# Patient Record
Sex: MALE | Race: WHITE | NOT HISPANIC OR LATINO | Employment: UNEMPLOYED | URBAN - METROPOLITAN AREA
[De-identification: names, ages, dates, MRNs, and addresses within clinical notes are randomized per-mention and may not be internally consistent; named-entity substitution may affect disease eponyms.]

---

## 2018-08-23 ENCOUNTER — HOSPITAL ENCOUNTER (INPATIENT)
Facility: HOSPITAL | Age: 53
LOS: 2 days | Discharge: HOME | DRG: 566 | End: 2018-08-25
Attending: EMERGENCY MEDICINE | Admitting: INTERNAL MEDICINE
Payer: MEDICAID

## 2018-08-23 ENCOUNTER — APPOINTMENT (EMERGENCY)
Dept: CT IMAGING | Facility: HOSPITAL | Age: 53
DRG: 566 | End: 2018-08-23
Payer: MEDICAID

## 2018-08-23 DIAGNOSIS — N17.9 AKI (ACUTE KIDNEY INJURY) (HCC): ICD-10-CM

## 2018-08-23 DIAGNOSIS — R73.9 HYPERGLYCEMIA: Primary | ICD-10-CM

## 2018-08-23 DIAGNOSIS — K85.90 PANCREATITIS: ICD-10-CM

## 2018-08-23 DIAGNOSIS — E11.65 TYPE 2 DIABETES MELLITUS WITH HYPERGLYCEMIA, WITH LONG-TERM CURRENT USE OF INSULIN (HCC): ICD-10-CM

## 2018-08-23 DIAGNOSIS — L98.9 SKIN LESION: ICD-10-CM

## 2018-08-23 DIAGNOSIS — Z79.4 TYPE 2 DIABETES MELLITUS WITH HYPERGLYCEMIA, WITH LONG-TERM CURRENT USE OF INSULIN (HCC): ICD-10-CM

## 2018-08-23 DIAGNOSIS — R10.9 ABDOMINAL PAIN: ICD-10-CM

## 2018-08-23 PROBLEM — K86.1 CHRONIC PANCREATITIS (HCC): Status: ACTIVE | Noted: 2018-08-23

## 2018-08-23 PROBLEM — E83.52 HYPERCALCEMIA: Status: ACTIVE | Noted: 2018-08-23

## 2018-08-23 PROBLEM — D35.00 ADRENAL ADENOMA: Status: ACTIVE | Noted: 2018-08-23

## 2018-08-23 PROBLEM — E87.1 HYPONATREMIA: Status: ACTIVE | Noted: 2018-08-23

## 2018-08-23 LAB
ACETONE SERPL-MCNC: NEGATIVE MG/DL
ALBUMIN SERPL BCP-MCNC: 3.6 G/DL (ref 3.5–5)
ALP SERPL-CCNC: 155 U/L (ref 46–116)
ALT SERPL W P-5'-P-CCNC: 14 U/L (ref 12–78)
ANION GAP SERPL CALCULATED.3IONS-SCNC: 12 MMOL/L (ref 4–13)
AST SERPL W P-5'-P-CCNC: 9 U/L (ref 5–45)
BACTERIA UR QL AUTO: ABNORMAL /HPF
BASE EX.OXY STD BLDV CALC-SCNC: 60.6 % (ref 60–80)
BASE EXCESS BLDV CALC-SCNC: 1.9 MMOL/L
BASOPHILS # BLD AUTO: 0.01 THOUSANDS/ΜL (ref 0–0.1)
BASOPHILS NFR BLD AUTO: 0 % (ref 0–1)
BILIRUB SERPL-MCNC: 0.31 MG/DL (ref 0.2–1)
BILIRUB UR QL STRIP: NEGATIVE
BUN SERPL-MCNC: 18 MG/DL (ref 5–25)
CALCIUM SERPL-MCNC: 10.4 MG/DL (ref 8.3–10.1)
CHLORIDE SERPL-SCNC: 90 MMOL/L (ref 100–108)
CLARITY UR: CLEAR
CO2 SERPL-SCNC: 28 MMOL/L (ref 21–32)
COLOR UR: YELLOW
CREAT SERPL-MCNC: 1.54 MG/DL (ref 0.6–1.3)
EOSINOPHIL # BLD AUTO: 0.1 THOUSAND/ΜL (ref 0–0.61)
EOSINOPHIL NFR BLD AUTO: 1 % (ref 0–6)
ERYTHROCYTE [DISTWIDTH] IN BLOOD BY AUTOMATED COUNT: 13.8 % (ref 11.6–15.1)
ETHANOL SERPL-MCNC: <3 MG/DL (ref 0–3)
GFR SERPL CREATININE-BSD FRML MDRD: 51 ML/MIN/1.73SQ M
GLUCOSE SERPL-MCNC: 655 MG/DL (ref 65–140)
GLUCOSE SERPL-MCNC: >500 MG/DL (ref 65–140)
GLUCOSE UR STRIP-MCNC: ABNORMAL MG/DL
HCO3 BLDV-SCNC: 26.8 MMOL/L (ref 24–30)
HCT VFR BLD AUTO: 42.6 % (ref 36.5–49.3)
HGB BLD-MCNC: 14 G/DL (ref 12–17)
HGB UR QL STRIP.AUTO: NEGATIVE
KETONES UR STRIP-MCNC: ABNORMAL MG/DL
LEUKOCYTE ESTERASE UR QL STRIP: ABNORMAL
LIPASE SERPL-CCNC: 1013 U/L (ref 73–393)
LYMPHOCYTES # BLD AUTO: 1.36 THOUSANDS/ΜL (ref 0.6–4.47)
LYMPHOCYTES NFR BLD AUTO: 19 % (ref 14–44)
MCH RBC QN AUTO: 29.5 PG (ref 26.8–34.3)
MCHC RBC AUTO-ENTMCNC: 32.9 G/DL (ref 31.4–37.4)
MCV RBC AUTO: 90 FL (ref 82–98)
MONOCYTES # BLD AUTO: 0.64 THOUSAND/ΜL (ref 0.17–1.22)
MONOCYTES NFR BLD AUTO: 9 % (ref 4–12)
NEUTROPHILS # BLD AUTO: 4.99 THOUSANDS/ΜL (ref 1.85–7.62)
NEUTS SEG NFR BLD AUTO: 70 % (ref 43–75)
NITRITE UR QL STRIP: NEGATIVE
NON-SQ EPI CELLS URNS QL MICRO: ABNORMAL /HPF
NRBC BLD AUTO-RTO: 0 /100 WBCS
O2 CT BLDV-SCNC: 12.2 ML/DL
PCO2 BLDV: 43.2 MM HG (ref 42–50)
PH BLDV: 7.41 [PH] (ref 7.3–7.4)
PH UR STRIP.AUTO: 5.5 [PH] (ref 4.5–8)
PLATELET # BLD AUTO: 474 THOUSANDS/UL (ref 149–390)
PMV BLD AUTO: 10.5 FL (ref 8.9–12.7)
PO2 BLDV: 32.1 MM HG (ref 35–45)
POTASSIUM SERPL-SCNC: 4.1 MMOL/L (ref 3.5–5.3)
PROT SERPL-MCNC: 9.4 G/DL (ref 6.4–8.2)
PROT UR STRIP-MCNC: NEGATIVE MG/DL
RBC # BLD AUTO: 4.74 MILLION/UL (ref 3.88–5.62)
RBC #/AREA URNS AUTO: ABNORMAL /HPF
SODIUM SERPL-SCNC: 130 MMOL/L (ref 136–145)
SP GR UR STRIP.AUTO: <=1.005 (ref 1–1.03)
UROBILINOGEN UR QL STRIP.AUTO: 0.2 E.U./DL
WBC # BLD AUTO: 7.1 THOUSAND/UL (ref 4.31–10.16)
WBC #/AREA URNS AUTO: ABNORMAL /HPF

## 2018-08-23 PROCEDURE — 82805 BLOOD GASES W/O2 SATURATION: CPT | Performed by: EMERGENCY MEDICINE

## 2018-08-23 PROCEDURE — 96374 THER/PROPH/DIAG INJ IV PUSH: CPT

## 2018-08-23 PROCEDURE — 85025 COMPLETE CBC W/AUTO DIFF WBC: CPT | Performed by: EMERGENCY MEDICINE

## 2018-08-23 PROCEDURE — 96361 HYDRATE IV INFUSION ADD-ON: CPT

## 2018-08-23 PROCEDURE — 96375 TX/PRO/DX INJ NEW DRUG ADDON: CPT

## 2018-08-23 PROCEDURE — 81001 URINALYSIS AUTO W/SCOPE: CPT

## 2018-08-23 PROCEDURE — 80320 DRUG SCREEN QUANTALCOHOLS: CPT | Performed by: EMERGENCY MEDICINE

## 2018-08-23 PROCEDURE — 99285 EMERGENCY DEPT VISIT HI MDM: CPT

## 2018-08-23 PROCEDURE — 36415 COLL VENOUS BLD VENIPUNCTURE: CPT | Performed by: EMERGENCY MEDICINE

## 2018-08-23 PROCEDURE — 83690 ASSAY OF LIPASE: CPT | Performed by: EMERGENCY MEDICINE

## 2018-08-23 PROCEDURE — 74177 CT ABD & PELVIS W/CONTRAST: CPT

## 2018-08-23 PROCEDURE — 82948 REAGENT STRIP/BLOOD GLUCOSE: CPT

## 2018-08-23 PROCEDURE — 99223 1ST HOSP IP/OBS HIGH 75: CPT | Performed by: INTERNAL MEDICINE

## 2018-08-23 PROCEDURE — 80053 COMPREHEN METABOLIC PANEL: CPT | Performed by: EMERGENCY MEDICINE

## 2018-08-23 PROCEDURE — 96360 HYDRATION IV INFUSION INIT: CPT

## 2018-08-23 PROCEDURE — 82009 KETONE BODYS QUAL: CPT | Performed by: EMERGENCY MEDICINE

## 2018-08-23 RX ORDER — ONDANSETRON 2 MG/ML
4 INJECTION INTRAMUSCULAR; INTRAVENOUS ONCE
Status: COMPLETED | OUTPATIENT
Start: 2018-08-23 | End: 2018-08-23

## 2018-08-23 RX ORDER — NICOTINE 21 MG/24HR
1 PATCH, TRANSDERMAL 24 HOURS TRANSDERMAL DAILY
Status: DISCONTINUED | OUTPATIENT
Start: 2018-08-24 | End: 2018-08-25 | Stop reason: HOSPADM

## 2018-08-23 RX ORDER — FENTANYL CITRATE 50 UG/ML
50 INJECTION, SOLUTION INTRAMUSCULAR; INTRAVENOUS ONCE
Status: COMPLETED | OUTPATIENT
Start: 2018-08-23 | End: 2018-08-23

## 2018-08-23 RX ORDER — ALPRAZOLAM 2 MG/1
2 TABLET, EXTENDED RELEASE ORAL EVERY MORNING
COMMUNITY
End: 2018-08-25 | Stop reason: HOSPADM

## 2018-08-23 RX ORDER — SODIUM CHLORIDE 9 MG/ML
125 INJECTION, SOLUTION INTRAVENOUS CONTINUOUS
Status: DISCONTINUED | OUTPATIENT
Start: 2018-08-23 | End: 2018-08-24

## 2018-08-23 RX ORDER — OXYCODONE HYDROCHLORIDE AND ACETAMINOPHEN 5; 325 MG/1; MG/1
1 TABLET ORAL EVERY 4 HOURS PRN
Status: DISCONTINUED | OUTPATIENT
Start: 2018-08-23 | End: 2018-08-24

## 2018-08-23 RX ORDER — ONDANSETRON 2 MG/ML
4 INJECTION INTRAMUSCULAR; INTRAVENOUS EVERY 6 HOURS PRN
Status: DISCONTINUED | OUTPATIENT
Start: 2018-08-23 | End: 2018-08-25 | Stop reason: HOSPADM

## 2018-08-23 RX ORDER — CLONIDINE HYDROCHLORIDE 0.1 MG/1
0.3 TABLET ORAL 2 TIMES DAILY
Status: DISCONTINUED | OUTPATIENT
Start: 2018-08-23 | End: 2018-08-25 | Stop reason: HOSPADM

## 2018-08-23 RX ORDER — ACETAMINOPHEN 325 MG/1
650 TABLET ORAL EVERY 6 HOURS SCHEDULED
Status: DISCONTINUED | OUTPATIENT
Start: 2018-08-23 | End: 2018-08-24

## 2018-08-23 RX ORDER — CLONIDINE HYDROCHLORIDE 0.3 MG/1
0.3 TABLET ORAL 2 TIMES DAILY
COMMUNITY

## 2018-08-23 RX ORDER — ALPRAZOLAM 0.5 MG/1
2 TABLET ORAL DAILY
Status: DISCONTINUED | OUTPATIENT
Start: 2018-08-24 | End: 2018-08-24

## 2018-08-23 RX ORDER — INSULIN GLARGINE 100 [IU]/ML
40 INJECTION, SOLUTION SUBCUTANEOUS EVERY MORNING
Status: DISCONTINUED | OUTPATIENT
Start: 2018-08-24 | End: 2018-08-23

## 2018-08-23 RX ORDER — HYDROXYZINE HYDROCHLORIDE 25 MG/1
25 TABLET, FILM COATED ORAL EVERY 6 HOURS PRN
Status: DISCONTINUED | OUTPATIENT
Start: 2018-08-23 | End: 2018-08-25 | Stop reason: HOSPADM

## 2018-08-23 RX ADMIN — IODIXANOL 90 ML: 320 INJECTION, SOLUTION INTRAVASCULAR at 18:38

## 2018-08-23 RX ADMIN — CLONIDINE HYDROCHLORIDE 0.3 MG: 0.1 TABLET ORAL at 21:05

## 2018-08-23 RX ADMIN — INSULIN LISPRO 5 UNITS: 100 INJECTION, SOLUTION INTRAVENOUS; SUBCUTANEOUS at 22:18

## 2018-08-23 RX ADMIN — ACETAMINOPHEN 650 MG: 325 TABLET ORAL at 22:19

## 2018-08-23 RX ADMIN — ONDANSETRON 4 MG: 2 INJECTION INTRAMUSCULAR; INTRAVENOUS at 17:47

## 2018-08-23 RX ADMIN — HYDROMORPHONE HYDROCHLORIDE 0.5 MG: 1 INJECTION, SOLUTION INTRAMUSCULAR; INTRAVENOUS; SUBCUTANEOUS at 19:07

## 2018-08-23 RX ADMIN — SODIUM CHLORIDE 1000 ML: 0.9 INJECTION, SOLUTION INTRAVENOUS at 17:43

## 2018-08-23 RX ADMIN — SODIUM CHLORIDE 125 ML/HR: 0.9 INJECTION, SOLUTION INTRAVENOUS at 21:47

## 2018-08-23 RX ADMIN — OXYCODONE HYDROCHLORIDE AND ACETAMINOPHEN 1 TABLET: 5; 325 TABLET ORAL at 21:05

## 2018-08-23 RX ADMIN — INSULIN LISPRO 10 UNITS: 100 INJECTION, SOLUTION INTRAVENOUS; SUBCUTANEOUS at 22:19

## 2018-08-23 RX ADMIN — FENTANYL CITRATE 50 MCG: 50 INJECTION INTRAMUSCULAR; INTRAVENOUS at 17:47

## 2018-08-23 NOTE — ED ATTENDING ATTESTATION
Elvis Nicole MD, saw and evaluated the patient  I have discussed the patient with the resident/non-physician practitioner and agree with the resident's/non-physician practitioner's findings, Plan of Care, and MDM as documented in the resident's/non-physician practitioner's note, except where noted  All available labs and Radiology studies were reviewed  At this point I agree with the current assessment done in the Emergency Department  I have conducted an independent evaluation of this patient a history and physical is as follows:  49 yo male visiting area from [place], living in hotel for 2 weeks with diabetes, brought to ED by ambulance with concern that his blood sugar are high, found to be >500 in ED, c/o fatigue, polyuria, polydipsia, abdominal pain, nausea without vomiting, no diarrhea, similar to previous of pancreatitis, and is concerned about wounds on his torso and arms that have been worsening for several weeks at this point  VS normal   Abd tender in the epigastric with some guarding  He has large excoriated, abraded patches on arms and buttocks, without surrounding erythema/induration/fluctuance  ED workup for complications of diabetes, check for pancreatitis, CT, replete fluids, treat pain, reassess  Although patient did not mention it, he was evaluated at [facility] 8/19/18 for similar complaint, with hyperglycemia, no DKA  Other than that, this is his only encounter on EPIC between our two hospitals  There was documentation of a discussion regarding recent opioids that patient wanted refilled overlapping with Rx in [place] on   Reviewing [place]  myself he had opioids filled 8/1 and 8/11 by two different providers and two different pharmacies  ED workup show pancreatitis, volume depletion, MAYRA  Admit      CriticalCare Time  Performed by: Cuauhtemoc Merritt  Authorized by: Prashant Roman, [address] [facility] provider statement:     Critical care time (minutes):  30    Critical care time was exclusive of:  Separately billable procedures and treating other patients and teaching time    Critical care was necessary to treat or prevent imminent or life-threatening deterioration of the following conditions:  Dehydration, pancreatits    Critical care was time spent personally by me on the following activities:  Blood draw for specimens, obtaining history from patient or surrogate, development of treatment plan with patient or surrogate, discussions with consultants, evaluation of patient's response to treatment, examination of patient, interpretation of cardiac output measurements, ordering and performing treatments and interventions, ordering and review of laboratory studies, ordering and review of radiographic studies, re-evaluation of patient's condition and review of old charts    I assumed direction of critical care for this patient from another provider in my specialty: no             Critical Care Time  CritCare Time    Procedures

## 2018-08-23 NOTE — ED NOTES
RN unable to obtain IV access at this time  Another RN at bedside attempting at this time       Tracie Adjutant, YOLANDA  08/23/18 4500

## 2018-08-23 NOTE — ASSESSMENT & PLAN NOTE
Present on admission secondary to hyperglycemia    Corrected sodium is 135  Continue with IV hydration with normal saline  Follow BMP closely

## 2018-08-23 NOTE — H&P
H&P- Helen Alas 1965, 48 y o  male MRN: [id number]    Unit/Bed#: [facility] [facility] 2 -01 Encounter: [id number]    Primary Care Provider: No primary care provider on file  Date and time admitted to hospital: 8/23/2018  5:11 PM        * Type 2 diabetes mellitus with hyperglycemia, with long-term current use of insulin ([facility] [address] )   Assessment & Plan    No results found for: HGBA1C    Recent Labs      08/23/18   1714   POCGLU  >500*       Blood Sugar Average: Last 72 hrs:  (P) 0   Patient presents with elevated blood sugar level  Complains of polydipsia and polyuria    Unknown last hemoglobin A1c  Similar admission last week at [facility]  Denies nausea vomiting or abdominal pain  Follow up on hemoglobin A1c  Continue with Lantus and pre meal insulin  Endocrine consultation will be obtained to establish outpatient follow-up        Skin lesion of back   Assessment & Plan    Patient has multiple excoriated pruritic lesions on back and upper aspect of chest   Could be related to poorly controlled diabetes  Dermatology consultation will be obtained  Continue with Atarax  Calamine  lotion for itching        Adrenal adenoma   Assessment & Plan    Incidental finding of subcentimeter adrenal adenomas  Outpatient follow-up CT scan in 1 year  Hypercalcemia   Assessment & Plan    Corrected calcium for low albumin is 10 7  Monitor ionized calcium and repeat after IV hydration        Hyponatremia   Assessment & Plan    Present on admission secondary to hyperglycemia  Corrected sodium is 135  Continue with IV hydration with normal saline  Follow BMP closely        Chronic pancreatitis [facility])   Assessment & Plan    Unclear etiology  Patient denies alcohol use and admits to daily marijuana use  Continue with IV fluids, antiemetics as needed analgesics  Avoid narcotic medication  Acute kidney injury [facility])   Assessment & Plan    Creatinine and 1 week back at [facility] was 1    Likely prerenal secondary to poor oral intake  Continue with IV hydration  Hold metformin  Follow up BMP  CT abdomen without obstructive etiology          VTE Prophylaxis: Enoxaparin (Lovenox)  / sequential compression device   Code Status: FULL CODE  POLST: POLST form is not discussed and not completed at this time  Discussion with family:  Discussed with patient    Anticipated Length of Stay:  Patient will be admitted on an Inpatient basis with an anticipated length of stay of  > 2 midnights  Justification for Hospital Stay:  IV fluids and blood glucose monitoring  Total Time for Visit, including Counseling / Coordination of Care: 30 minutes  Greater than 50% of this total time spent on direct patient counseling and coordination of care  Chief Complaint:   High blood sugar    History of Present Illness:    Zan Torres is a 48 y o  male who presents with elevated blood glucose level  Patient has history of type 2 diabetes which is insulin dependent and has had a recent presentation to [facility] Emergency Department  on 08/19 for similar complaint with hyperglycemia without DKA  He was discharged on 40 units of insulin glargine and insulin lispro sliding scale with meals  He also takes metformin 1000 mg twice daily  Patient however continued to complain of polyuria and polydipsia  He also complained of abdominal discomfort and multiple wounds on his back which r hospital there for over a month  He also complains of intense pruritus and has multiple excoriated lesions on his lower back anterior upper aspect of chest   Denied any fever or chills  Denied any nausea vomiting or diarrhea  Denied any drainage or discharge from his wounds  Patient does complain of abdominal discomfort which is chronic    He was demanding refilling of his narcotic medications however [facility] by the emergency room physician patient has had 2 narcotic refills on 8/1 and 8/11 Review of Systems:    Review of Systems   Constitutional: Positive for appetite change  HENT: Negative  Eyes: Negative  Respiratory: Negative  Cardiovascular: Negative  Gastrointestinal: Positive for abdominal pain  Endocrine: Positive for polydipsia and polyuria  Genitourinary: Positive for frequency  Musculoskeletal: Negative  Skin: Positive for rash and wound  Allergic/Immunologic: Negative  Neurological: Negative  Hematological: Negative  Psychiatric/Behavioral: Negative  Past Medical and Surgical History:     Past Medical History:   Diagnosis Date    Diabetes mellitus ([facility] [address] )     Hyperlipidemia     Hypertension        Past Surgical History:   Procedure Laterality Date    ABCESS DRAINAGE      HERNIA REPAIR         Meds/Allergies:    Prior to Admission medications    Medication Sig Start Date End Date Taking? Authorizing Provider   ALPRAZolam ER (XANAX XR) 2 MG 24 hr tablet Take 2 mg by mouth every morning    Historical Provider, MD   cloNIDine (CATAPRES) 0 3 mg tablet Take 0 3 mg by mouth 2 (two) times a day    Historical Provider, MD   Insulin Glargine (TOUJEO MAX SOLOSTAR SC) Inject 40 Units under the skin every morning    Historical Provider, MD   insulin lispro (HumaLOG) 100 units/mL injection Inject under the skin    Historical Provider, MD   metFORMIN (GLUCOPHAGE) 1000 MG tablet Take 1,000 mg by mouth 2 (two) times a day with meals    Historical Provider, MD     I have reviewed home medications using allscripts  Allergies:    Allergies   Allergen Reactions    Penicillins     Toradol [Ketorolac Tromethamine]        Social History:     Marital Status: Single   Substance Use History:   History   Alcohol Use No     History   Smoking Status    Current Every Day Smoker    Packs/day: 1 00   Smokeless Tobacco    Never Used     History   Drug Use No       Family History:    non-contributory    Physical Exam:     Vitals:   Blood Pressure: 155/88 (08/23/18 1910)  Pulse: 81 (08/23/18 1910)  Temperature: 98 3 °F (36 8 °C) (08/23/18 1710)  Temp Source: Oral (08/23/18 1710)  Respirations: 20 (08/23/18 1910)  Weight - Scale: 68 kg (150 lb) (08/23/18 1710)  SpO2: 100 % (08/23/18 1910)    Physical Exam   Constitutional: He is oriented to person, place, and time  He appears distressed  Appears distress secondary to pain and unkept   HENT:   Head: Normocephalic and atraumatic  Mouth/Throat: Oropharynx is clear and moist    Eyes: Conjunctivae are normal  Pupils are equal, round, and reactive to light  Neck: Normal range of motion  Neck supple  Cardiovascular: Normal rate and regular rhythm  Pulmonary/Chest: Effort normal and breath sounds normal    Abdominal: Soft  Bowel sounds are normal    Mild epigastric tenderness without rebound or guarding   Musculoskeletal: He exhibits no edema  Neurological: He is alert and oriented to person, place, and time  Skin: Rash noted  He is not diaphoretic  Multiple excoriated skin lesions along the upper aspect of his chest, scalp and back  No drainage or discharge  No surrounding erythema  Additional Data:     Lab Results: I have personally reviewed pertinent reports  Results from last 7 days  Lab Units 08/23/18  1739   WBC Thousand/uL 7 10   HEMOGLOBIN g/dL 14 0   HEMATOCRIT % 42 6   PLATELETS Thousands/uL 474*   NEUTROS PCT % 70   LYMPHS PCT % 19   MONOS PCT % 9   EOS PCT % 1       Results from last 7 days  Lab Units 08/23/18  1739   SODIUM mmol/L 130*   POTASSIUM mmol/L 4 1   CHLORIDE mmol/L 90*   CO2 mmol/L 28   BUN mg/dL 18   CREATININE mg/dL 1 54*   CALCIUM mg/dL 10 4*   TOTAL PROTEIN g/dL 9 4*   BILIRUBIN TOTAL mg/dL 0 31   ALK PHOS U/L 155*   ALT U/L 14   AST U/L 9   GLUCOSE RANDOM mg/dL 655*           Results from last 7 days  Lab Units 08/23/18 2017 08/23/18  1714   POC GLUCOSE mg/dl >500* >500*           Imaging: I have personally reviewed pertinent reports        CT abdomen pelvis with contrast Final Result by Sol Moses MD (08/23 1851)         1  No evidence of acute pancreatitis  2   No evidence of bowel obstruction, colitis or diverticulitis  3   Nodularity of the bilateral adrenal gland suggesting subcentimeter lesions  Although its imaging features are indeterminate, it does not have suspicious imaging features (heterogeneity, necrosis, irregular margins), therefore this is likely benign,    and can be followed by non-contrast abdomen CT or MRI in 12 months  If patient has history of adrenal hyperfunction, consider biochemical evaluation  Recommendation based on institutional consensus and [address] of Radiology [phone]                  Workstation performed: [id number]             EKG, Pathology, and Other Studies Reviewed on Admission:   · EKG:  Reviewed    Allscripts / Epic Records Reviewed: Yes     ** Please Note: This note has been constructed using a voice recognition system   **

## 2018-08-23 NOTE — ASSESSMENT & PLAN NOTE
Unclear etiology  Patient denies alcohol use and admits to daily marijuana use  Continue with IV fluids, antiemetics as needed analgesics  Avoid narcotic medication

## 2018-08-23 NOTE — ASSESSMENT & PLAN NOTE
No results found for: HGBA1C    Recent Labs      08/23/18   1714   POCGLU  >500*       Blood Sugar Average: Last 72 hrs:  (P) 0   Patient presents with elevated blood sugar level  Complains of polydipsia and polyuria    Unknown last hemoglobin A1c  Similar admission last week at [facility]  Denies nausea vomiting or abdominal pain    Follow up on hemoglobin A1c  Continue with Lantus and pre meal insulin  Endocrine consultation will be obtained to establish outpatient follow-up

## 2018-08-23 NOTE — ED NOTES
Patient keeps asking for something to drink; patient has been told multiple times that patient cannot have something to drink     Aleksandra Mishra RN  08/23/18 4833

## 2018-08-23 NOTE — ASSESSMENT & PLAN NOTE
Creatinine and 1 week back at [facility] was 1  Likely prerenal secondary to poor oral intake  Continue with IV hydration  Hold metformin  Follow up BMP    CT abdomen without obstructive etiology

## 2018-08-24 LAB
ANION GAP SERPL CALCULATED.3IONS-SCNC: 9 MMOL/L (ref 4–13)
BASOPHILS # BLD AUTO: 0.02 THOUSANDS/ΜL (ref 0–0.1)
BASOPHILS NFR BLD AUTO: 0 % (ref 0–1)
BUN SERPL-MCNC: 16 MG/DL (ref 5–25)
CALCIUM SERPL-MCNC: 9.4 MG/DL (ref 8.3–10.1)
CHLORIDE SERPL-SCNC: 100 MMOL/L (ref 100–108)
CO2 SERPL-SCNC: 27 MMOL/L (ref 21–32)
CREAT SERPL-MCNC: 0.8 MG/DL (ref 0.6–1.3)
EOSINOPHIL # BLD AUTO: 0.29 THOUSAND/ΜL (ref 0–0.61)
EOSINOPHIL NFR BLD AUTO: 4 % (ref 0–6)
ERYTHROCYTE [DISTWIDTH] IN BLOOD BY AUTOMATED COUNT: 13.9 % (ref 11.6–15.1)
EST. AVERAGE GLUCOSE BLD GHB EST-MCNC: 255 MG/DL
GFR SERPL CREATININE-BSD FRML MDRD: 102 ML/MIN/1.73SQ M
GLUCOSE SERPL-MCNC: 152 MG/DL (ref 65–140)
GLUCOSE SERPL-MCNC: 158 MG/DL (ref 65–140)
GLUCOSE SERPL-MCNC: 185 MG/DL (ref 65–140)
GLUCOSE SERPL-MCNC: 220 MG/DL (ref 65–140)
GLUCOSE SERPL-MCNC: 225 MG/DL (ref 65–140)
GLUCOSE SERPL-MCNC: 227 MG/DL (ref 65–140)
GLUCOSE SERPL-MCNC: 269 MG/DL (ref 65–140)
GLUCOSE SERPL-MCNC: 299 MG/DL (ref 65–140)
GLUCOSE SERPL-MCNC: 305 MG/DL (ref 65–140)
GLUCOSE SERPL-MCNC: 418 MG/DL (ref 65–140)
HBA1C MFR BLD: 10.5 % (ref 4.2–6.3)
HCT VFR BLD AUTO: 35.3 % (ref 36.5–49.3)
HGB BLD-MCNC: 11.6 G/DL (ref 12–17)
LYMPHOCYTES # BLD AUTO: 3.44 THOUSANDS/ΜL (ref 0.6–4.47)
LYMPHOCYTES NFR BLD AUTO: 43 % (ref 14–44)
MCH RBC QN AUTO: 29.5 PG (ref 26.8–34.3)
MCHC RBC AUTO-ENTMCNC: 32.9 G/DL (ref 31.4–37.4)
MCV RBC AUTO: 90 FL (ref 82–98)
MONOCYTES # BLD AUTO: 0.79 THOUSAND/ΜL (ref 0.17–1.22)
MONOCYTES NFR BLD AUTO: 10 % (ref 4–12)
NEUTROPHILS # BLD AUTO: 3.42 THOUSANDS/ΜL (ref 1.85–7.62)
NEUTS SEG NFR BLD AUTO: 43 % (ref 43–75)
NRBC BLD AUTO-RTO: 0 /100 WBCS
PLATELET # BLD AUTO: 459 THOUSANDS/UL (ref 149–390)
PMV BLD AUTO: 10.3 FL (ref 8.9–12.7)
POTASSIUM SERPL-SCNC: 3.7 MMOL/L (ref 3.5–5.3)
RBC # BLD AUTO: 3.93 MILLION/UL (ref 3.88–5.62)
SODIUM SERPL-SCNC: 136 MMOL/L (ref 136–145)
TSH SERPL DL<=0.05 MIU/L-ACNC: 0.81 UIU/ML (ref 0.36–3.74)
WBC # BLD AUTO: 7.96 THOUSAND/UL (ref 4.31–10.16)

## 2018-08-24 PROCEDURE — 99232 SBSQ HOSP IP/OBS MODERATE 35: CPT | Performed by: INTERNAL MEDICINE

## 2018-08-24 PROCEDURE — 85025 COMPLETE CBC W/AUTO DIFF WBC: CPT | Performed by: INTERNAL MEDICINE

## 2018-08-24 PROCEDURE — 87147 CULTURE TYPE IMMUNOLOGIC: CPT | Performed by: DERMATOLOGY

## 2018-08-24 PROCEDURE — 80048 BASIC METABOLIC PNL TOTAL CA: CPT | Performed by: INTERNAL MEDICINE

## 2018-08-24 PROCEDURE — 87205 SMEAR GRAM STAIN: CPT | Performed by: DERMATOLOGY

## 2018-08-24 PROCEDURE — 87070 CULTURE OTHR SPECIMN AEROBIC: CPT | Performed by: DERMATOLOGY

## 2018-08-24 PROCEDURE — 83036 HEMOGLOBIN GLYCOSYLATED A1C: CPT | Performed by: INTERNAL MEDICINE

## 2018-08-24 PROCEDURE — 82948 REAGENT STRIP/BLOOD GLUCOSE: CPT

## 2018-08-24 PROCEDURE — 87186 SC STD MICRODIL/AGAR DIL: CPT | Performed by: DERMATOLOGY

## 2018-08-24 PROCEDURE — 84443 ASSAY THYROID STIM HORMONE: CPT | Performed by: INTERNAL MEDICINE

## 2018-08-24 PROCEDURE — 99254 IP/OBS CNSLTJ NEW/EST MOD 60: CPT | Performed by: INTERNAL MEDICINE

## 2018-08-24 RX ORDER — INSULIN GLARGINE 100 [IU]/ML
30 INJECTION, SOLUTION SUBCUTANEOUS EVERY 12 HOURS SCHEDULED
Status: DISCONTINUED | OUTPATIENT
Start: 2018-08-24 | End: 2018-08-24

## 2018-08-24 RX ORDER — ALPRAZOLAM 0.5 MG/1
2 TABLET ORAL DAILY
Status: DISCONTINUED | OUTPATIENT
Start: 2018-08-24 | End: 2018-08-25

## 2018-08-24 RX ORDER — LORAZEPAM 2 MG/ML
0.5 INJECTION INTRAMUSCULAR ONCE
Status: COMPLETED | OUTPATIENT
Start: 2018-08-24 | End: 2018-08-24

## 2018-08-24 RX ORDER — MINOCYCLINE HYDROCHLORIDE 100 MG/1
100 CAPSULE ORAL EVERY 12 HOURS SCHEDULED
Status: DISCONTINUED | OUTPATIENT
Start: 2018-08-24 | End: 2018-08-25 | Stop reason: HOSPADM

## 2018-08-24 RX ORDER — LISINOPRIL 5 MG/1
5 TABLET ORAL DAILY
Status: DISCONTINUED | OUTPATIENT
Start: 2018-08-24 | End: 2018-08-25

## 2018-08-24 RX ORDER — INSULIN GLARGINE 100 [IU]/ML
40 INJECTION, SOLUTION SUBCUTANEOUS EVERY 12 HOURS SCHEDULED
Status: DISCONTINUED | OUTPATIENT
Start: 2018-08-24 | End: 2018-08-25 | Stop reason: HOSPADM

## 2018-08-24 RX ORDER — OXYCODONE HYDROCHLORIDE AND ACETAMINOPHEN 5; 325 MG/1; MG/1
1 TABLET ORAL EVERY 6 HOURS PRN
Status: DISCONTINUED | OUTPATIENT
Start: 2018-08-24 | End: 2018-08-25

## 2018-08-24 RX ORDER — INSULIN GLARGINE 100 [IU]/ML
40 INJECTION, SOLUTION SUBCUTANEOUS EVERY 12 HOURS SCHEDULED
Status: DISCONTINUED | OUTPATIENT
Start: 2018-08-25 | End: 2018-08-24

## 2018-08-24 RX ORDER — INSULIN GLARGINE 100 [IU]/ML
20 INJECTION, SOLUTION SUBCUTANEOUS EVERY 12 HOURS SCHEDULED
Status: DISCONTINUED | OUTPATIENT
Start: 2018-08-24 | End: 2018-08-24

## 2018-08-24 RX ADMIN — LORAZEPAM 0.5 MG: 2 INJECTION INTRAMUSCULAR; INTRAVENOUS at 22:07

## 2018-08-24 RX ADMIN — INSULIN GLARGINE 20 UNITS: 100 INJECTION, SOLUTION SUBCUTANEOUS at 12:11

## 2018-08-24 RX ADMIN — OXYCODONE HYDROCHLORIDE AND ACETAMINOPHEN 1 TABLET: 5; 325 TABLET ORAL at 21:43

## 2018-08-24 RX ADMIN — CLONIDINE HYDROCHLORIDE 0.3 MG: 0.1 TABLET ORAL at 17:00

## 2018-08-24 RX ADMIN — INSULIN GLARGINE 40 UNITS: 100 INJECTION, SOLUTION SUBCUTANEOUS at 22:17

## 2018-08-24 RX ADMIN — OXYCODONE HYDROCHLORIDE AND ACETAMINOPHEN 1 TABLET: 5; 325 TABLET ORAL at 05:10

## 2018-08-24 RX ADMIN — SODIUM CHLORIDE 125 ML/HR: 0.9 INJECTION, SOLUTION INTRAVENOUS at 05:31

## 2018-08-24 RX ADMIN — ONDANSETRON 4 MG: 2 INJECTION INTRAMUSCULAR; INTRAVENOUS at 15:39

## 2018-08-24 RX ADMIN — LISINOPRIL 5 MG: 5 TABLET ORAL at 12:11

## 2018-08-24 RX ADMIN — HYDROXYZINE HYDROCHLORIDE 25 MG: 25 TABLET, FILM COATED ORAL at 20:21

## 2018-08-24 RX ADMIN — SODIUM CHLORIDE 4 UNITS/HR: 9 INJECTION, SOLUTION INTRAVENOUS at 03:13

## 2018-08-24 RX ADMIN — OXYCODONE HYDROCHLORIDE AND ACETAMINOPHEN 1 TABLET: 5; 325 TABLET ORAL at 01:14

## 2018-08-24 RX ADMIN — OXYCODONE HYDROCHLORIDE AND ACETAMINOPHEN 1 TABLET: 5; 325 TABLET ORAL at 15:38

## 2018-08-24 RX ADMIN — CLONIDINE HYDROCHLORIDE 0.3 MG: 0.1 TABLET ORAL at 08:40

## 2018-08-24 RX ADMIN — SODIUM CHLORIDE 9 UNITS/HR: 9 INJECTION, SOLUTION INTRAVENOUS at 01:12

## 2018-08-24 RX ADMIN — INSULIN LISPRO 10 UNITS: 100 INJECTION, SOLUTION INTRAVENOUS; SUBCUTANEOUS at 13:00

## 2018-08-24 RX ADMIN — ACETAMINOPHEN 650 MG: 325 TABLET ORAL at 11:27

## 2018-08-24 RX ADMIN — OXYCODONE HYDROCHLORIDE AND ACETAMINOPHEN 1 TABLET: 5; 325 TABLET ORAL at 09:20

## 2018-08-24 RX ADMIN — MINOCYCLINE HYDROCHLORIDE 100 MG: 100 CAPSULE ORAL at 21:42

## 2018-08-24 RX ADMIN — ALPRAZOLAM 2 MG: 0.5 TABLET ORAL at 08:40

## 2018-08-24 RX ADMIN — INSULIN LISPRO 8 UNITS: 100 INJECTION, SOLUTION INTRAVENOUS; SUBCUTANEOUS at 16:21

## 2018-08-24 RX ADMIN — INSULIN LISPRO 10 UNITS: 100 INJECTION, SOLUTION INTRAVENOUS; SUBCUTANEOUS at 16:21

## 2018-08-24 RX ADMIN — MUPIROCIN: 20 OINTMENT TOPICAL at 17:00

## 2018-08-24 RX ADMIN — ALPRAZOLAM 2 MG: 0.5 TABLET ORAL at 01:25

## 2018-08-24 RX ADMIN — ACETAMINOPHEN 650 MG: 325 TABLET ORAL at 05:10

## 2018-08-24 RX ADMIN — INSULIN LISPRO 2 UNITS: 100 INJECTION, SOLUTION INTRAVENOUS; SUBCUTANEOUS at 22:09

## 2018-08-24 RX ADMIN — INSULIN LISPRO 2 UNITS: 100 INJECTION, SOLUTION INTRAVENOUS; SUBCUTANEOUS at 13:00

## 2018-08-24 NOTE — CASE MANAGEMENT
Initial Clinical Review    Admission: Date/Time/Statement: 8/23/18 @ 1926     Orders Placed This Encounter   Procedures    Inpatient Admission (expected length of stay for this patient is greater than two midnights)     Standing Status:   Standing     Number of Occurrences:   1     Order Specific Question:   Admitting Physician     Answer:   Maris Mcmahan [[id number]]     Order Specific Question:   Level of Care     Answer:   Med Surg [[id number]]     Order Specific Question:   Estimated length of stay     Answer:   More than 2 Midnights     Order Specific Question:   Certification     Answer:   I certify that inpatient services are medically necessary for this patient for a duration of greater than two midnights  See H&P and MD Progress Notes for additional information about the patient's course of treatment  ED: Date/Time/Mode of Arrival:   ED Arrival Information     Expected Arrival Acuity Means of Arrival Escorted By Service Admission Type    - 8/23/2018 17:06 Urgent Ambulance [facility] [facility] Medicine Urgent    Arrival Complaint    wound check          Chief Complaint:   Chief Complaint   Patient presents with    Hyperglycemia - no symptoms     patient transported via EMS; patient states that he feels like his sugars have been high; patient has been taking his daibetes medications; patient also complaining of wound pain from multiple wounds on patient's back and arms;     Wound Check       History of Illness: 61-year-old man comes in for evaluation of hyperglycemia, wound pain, abdominal pain  Patient has a past medical history notable for type 2 diabetes for which he states he has been hospital multiple times, chronic pancreatitis  Patient denies any alcohol use, denies any gallbladder pathology does not know why he continues to pancreatitis  Patient states for last 3-4 days, he has had polydipsia and polyuria and increased fatigue as well as abdominal pain   Patient also states that for last month, he has had excoriated wounds on his arms and back there causing him pain  He has had progressive worsening abdominal pain for the last 2 days  Patient sources nausea without vomiting, denies fevers chills sweats, denies chest pain or shortness of breath  ED Vital Signs:   ED Triage Vitals   Temperature Pulse Respirations Blood Pressure SpO2   08/23/18 1710 08/23/18 1710 08/23/18 1710 08/23/18 1710 08/23/18 1710   98 3 °F (36 8 °C) 84 20 134/65 99 %      Temp Source Heart Rate Source Patient Position - Orthostatic VS BP Location FiO2 (%)   08/23/18 1710 08/23/18 1710 08/23/18 1710 08/23/18 1710 --   Oral Monitor Sitting Left arm       Pain Score       08/23/18 1747       Worst Possible Pain        Wt Readings from Last 1 Encounters:   08/23/18 77 7 kg (171 lb 4 8 oz)       Abnormal Labs/Diagnostic Test Results:  Na 130,   Cl 90,   Cr 1 54,   Ca 10 4,   T Pro 9 4,   Alk phos 155,     Glu 655  Lipase 1,013  Plats 474  Venous bld gas:  7 411 / 43 2 / 32 1 / 60 6%  Urine:  Glu >=1,000,   Ketones 2+  CT A&P:    1  No evidence of acute pancreatitis  2   No evidence of bowel obstruction, colitis or diverticulitis  3   Nodularity of the bilateral adrenal gland suggesting subcentimeter lesions  Although its imaging features are indeterminate, it does not have suspicious imaging features (heterogeneity, necrosis, irregular margins), therefore this is likely benign,    and can be followed by non-contrast abdomen CT or MRI in 12 months  If patient has history of adrenal hyperfunction, consider biochemical evaluation            ED Treatment:   Medication Administration from 08/23/2018 1706 to 08/23/2018 2012       Date/Time Order Dose Route Action Action by Comments     08/23/2018 1940 sodium chloride 0 9 % bolus 1,000 mL 0 mL Intravenous Stopped       08/23/2018 1743 sodium chloride 0 9 % bolus 1,000 mL 1,000 mL Intravenous New Bag       08/23/2018 1747 ondansetron (ZOFRAN) injection 4 mg 4 mg Intravenous Given 08/23/2018 1747 fentanyl citrate (PF) 100 MCG/2ML 50 mcg 50 mcg Intravenous Given       08/23/2018 1838 iodixanol (VISIPAQUE) 320 MG/ML injection 90 mL 90 mL Intravenous Given       08/23/2018 1907 HYDROmorphone (DILAUDID) injection 0 5 mg 0 5 mg Intravenous Given            Past Medical/Surgical History: Active Ambulatory Problems     Diagnosis Date Noted    No Active Ambulatory Problems     Resolved Ambulatory Problems     Diagnosis Date Noted    No Resolved Ambulatory Problems     Past Medical History:   Diagnosis Date    Diabetes mellitus ([facility] )     Hyperlipidemia     Hypertension        Admitting Diagnosis: Pancreatitis [K85 90]  Skin lesion [L98 9]  Abdominal pain [R10 9]  Hyperglycemia [R73 9]  MAYRA (acute kidney injury) ([facility] ) [N17 9]    Age/Sex: 48 y o  male    Assessment/Plan:   * Type 2 diabetes mellitus with hyperglycemia, with long-term current use of insulin ([place] [address] )   Assessment & Plan     No results found for: HGBA1C         Recent Labs      08/23/18   1714   POCGLU  >500*         Blood Sugar Average: Last 72 hrs:  (P) 0   Patient presents with elevated blood sugar level  Complains of polydipsia and polyuria    Unknown last hemoglobin A1c  Similar admission last week at [facility]  Denies nausea vomiting or abdominal pain  Follow up on hemoglobin A1c  Continue with Lantus and pre meal insulin  Endocrine consultation will be obtained to establish outpatient follow-up          Skin lesion of back   Assessment & Plan     Patient has multiple excoriated pruritic lesions on back and upper aspect of chest   Could be related to poorly controlled diabetes  Dermatology consultation will be obtained  Continue with Atarax  Calamine  lotion for itching          Adrenal adenoma   Assessment & Plan     Incidental finding of subcentimeter adrenal adenomas    Outpatient follow-up CT scan in 1 year           Hypercalcemia   Assessment & Plan     Corrected calcium for low albumin is 10 7  Monitor ionized calcium and repeat after IV hydration          Hyponatremia   Assessment & Plan     Present on admission secondary to hyperglycemia  Corrected sodium is 135  Continue with IV hydration with normal saline  Follow BMP closely          Chronic pancreatitis [facility])   Assessment & Plan     Unclear etiology  Patient denies alcohol use and admits to daily marijuana use  Continue with IV fluids, antiemetics as needed analgesics  Avoid narcotic medication           Acute kidney injury [facility])   Assessment & Plan     Creatinine and 1 week back at [facility] was 1  Likely prerenal secondary to poor oral intake  Continue with IV hydration  Hold metformin  Follow up BMP  CT abdomen without obstructive etiology             VTE Prophylaxis: Enoxaparin (Lovenox)  / sequential compression device   Code Status: FULL CODE  POLST: POLST form is not discussed and not completed at this time  Discussion with family:  Discussed with patient     Anticipated Length of Stay:  Patient will be admitted on an Inpatient basis with an anticipated length of stay of  > 2 midnights     Justification for Hospital Stay:  IV fluids and blood glucose monitoring      Admission Orders:  IP  CCD 2 Diet  HgA1C  Consult Endo  Consult Derm    Scheduled Meds:   Current Facility-Administered Medications:  acetaminophen 650 mg Oral Q6H [address]     ALPRAZolam 2 mg Oral Daily     cloNIDine 0 3 mg Oral BID                     nicotine 1 patch Transdermal Daily                               Continuous Infusions:   insulin regular (HumuLIN R,NovoLIN R) infusion 0 3-21 Units/hr Last Rate: 7 Units/hr (08/24/18 0933)   sodium chloride 125 mL/hr Last Rate: 125 mL/hr (08/24/18 0531)     PRN Meds: hydrOXYzine HCL    ondansetron    oxyCODONE-acetaminophen  BS's > 500 x 2    8/24: 97 7 - 69 - 18   140/97  BS's 418,  269, 225,  227, 158,   299    Thank you,  [address] Utilization Review Department  Phone: [phone]; Fax [phone]  ATTENTION: Please call with any questions or concerns to [phone]  and carefully follow the prompts so that you are directed to the right person  Send all requests for admission clinical reviews, approved or denied determinations and any other requests to fax [phone]   All voicemails are confidential

## 2018-08-24 NOTE — PLAN OF CARE
Problem: Potential for Falls  Goal: Patient will remain free of falls  INTERVENTIONS:  - Assess patient frequently for physical needs  -  Identify cognitive and physical deficits and behaviors that affect risk of falls    -  [place] fall precautions as indicated by assessment   - Educate patient/family on patient safety including physical limitations  - Instruct patient to call for assistance with activity based on assessment  - Modify environment to reduce risk of injury  - Consider OT/PT consult to assist with strengthening/mobility   Outcome: Progressing      Problem: PAIN - ADULT  Goal: Verbalizes/displays adequate comfort level or baseline comfort level  Interventions:  - Encourage patient to monitor pain and request assistance  - Assess pain using appropriate pain scale  - Administer analgesics based on type and severity of pain and evaluate response  - Implement non-pharmacological measures as appropriate and evaluate response  - Consider cultural and social influences on pain and pain management  - Notify physician/advanced practitioner if interventions unsuccessful or patient reports new pain  Outcome: Progressing      Problem: INFECTION - ADULT  Goal: Absence or prevention of progression during hospitalization  INTERVENTIONS:  - Assess and monitor for signs and symptoms of infection  - Monitor lab/diagnostic results  - Monitor all insertion sites, i e  indwelling lines, tubes, and drains  - Monitor endotracheal (as able) and nasal secretions for changes in amount and color  - [place] appropriate cooling/warming therapies per order  - Administer medications as ordered  - Instruct and encourage patient and family to use good hand hygiene technique  - Identify and instruct in appropriate isolation precautions for identified infection/condition  Outcome: Progressing    Goal: Absence of fever/infection during neutropenic period  INTERVENTIONS:  - Monitor WBC  - Implement neutropenic guidelines  Outcome: Progressing      Problem: SAFETY ADULT  Goal: Maintain or return to baseline ADL function  INTERVENTIONS:  -  Assess patient's ability to carry out ADLs; assess patient's baseline for ADL function and identify physical deficits which impact ability to perform ADLs (bathing, care of mouth/teeth, toileting, grooming, dressing, etc )  - Assess/evaluate cause of self-care deficits   - Assess range of motion  - Assess patient's mobility; develop plan if impaired  - Assess patient's need for assistive devices and provide as appropriate  - Encourage maximum independence but intervene and supervise when necessary  ¯ Involve family in performance of ADLs  ¯ Assess for home care needs following discharge   ¯ Request OT consult to assist with ADL evaluation and planning for discharge  ¯ Provide patient education as appropriate  Outcome: Progressing    Goal: Maintain or return mobility status to optimal level  INTERVENTIONS:  - Assess patient's baseline mobility status (ambulation, transfers, stairs, etc )    - Identify cognitive and physical deficits and behaviors that affect mobility  - Identify mobility aids required to assist with transfers and/or ambulation (gait belt, sit-to-stand, lift, walker, cane, etc )  - [place] fall precautions as indicated by assessment  - Record patient progress and toleration of activity level on Mobility SBAR; progress patient to next Phase/Stage  - Instruct patient to call for assistance with activity based on assessment  - Request Rehabilitation consult to assist with strengthening/weightbearing, etc   Outcome: Progressing      Problem: DISCHARGE PLANNING  Goal: Discharge to home or other facility with appropriate resources  INTERVENTIONS:  - Identify barriers to discharge w/patient and caregiver  - Arrange for needed discharge resources and transportation as appropriate  - Identify discharge learning needs (meds, wound care, etc )  - Arrange for interpretive services to assist at discharge as needed  - Refer to Case Management Department for coordinating discharge planning if the patient needs post-hospital services based on physician/advanced practitioner order or complex needs related to functional status, cognitive ability, or social support system  Outcome: Progressing      Problem: Knowledge Deficit  Goal: Patient/family/caregiver demonstrates understanding of disease process, treatment plan, medications, and discharge instructions  Complete learning assessment and assess knowledge base    Interventions:  - Provide teaching at level of understanding  - Provide teaching via preferred learning methods  Outcome: Progressing

## 2018-08-24 NOTE — CONSULTS
Consultation - Aline Medrano 48 y o  male MRN: [id number]    Unit/Bed#: [place] 2 -01 Encounter: [id number]      Assessment/Plan     Assessment: This is a 48y o -year-old male with type 2 diabetes with severe hyperglycemia, was managed on insulin infusion, insulin infusion was stopped at 12:00 p m , and was given Lantus 20 U, with hyponatremia, most likely related to hyperglycemia, with  acute renal failure, and bilateral adrenal nodularity incidental finding on CT    Plan:  1  Type 2 diabetes with hyperglycemia  -will increase Lantus to 30 U in the morning  -continue Humalog 10 U with meals plus scale  -continue Humalog scale with algorithm 4 with meals  -start Humalog scale at bedtime and at 2:00 a m   -continue to monitor blood sugar and adjust the insulin regimen accordingly  -on discharge patient will need to follow up with Endocrinology in 1-2 weeks  -he can resume home regimen on discharge, as well as metformin 1000 mg twice a day  2  Acute renal failure/hypercalcemia-creatinine and calcium improved, after improved glycemic control as well as correction of dehydration    3  Bilateral adrenal nodularity-patient will need biochemical workup for adrenal nodularity on outpatient basis  He will need a follow-up appointment with endocrinology in 2 weeks after discharge    Thank you for consulting Endocrinology, please do not hesitate to call if you have any questions      CC: Diabetes Consult    History of Present Illness     HPI: Aline Medrano is a 48y o  year old male with type 2 diabetes , uncontrolled on insulin regimen was admitted for hyperglycemia, point of care blood sugar was more than 500 when he presented to ED  His blood sugar on chemistry was 655, creatinine 1 4 anion gap of 12 serum sodium of 130, lipase 1013  HIS currently on carbohydrate controlled diet    Lab Results   Component Value Date    CREATININE 0 80 08/24/2018         He has history of chronic pancreatitis    CT scan of abdomen did not show acute pancreatitis  At home he takes insulin Lantus and Humalog, he takes toujeo, glargine, 40 U in the morning, and Humalog 10-20 units with meals based on his blood sugars  He follows with endocrinologist in [place]  Currently blood sugars are controlled, 150-180 range on current insulin regimen  He was treated with insulin infusion, until 12 p m , insulin infusion was stopped, and he was given Lantus 20 U  His last A1c is 10 5% which is uncontrolled  He has complications of diabetes such as neuropathy, he denies retinopathy  He also has history of nonhealing wound on his back    CT scan of abdomen showed bilateral adrenal nodularity  IMPRESSION:        1  No evidence of acute pancreatitis  2   No evidence of bowel obstruction, colitis or diverticulitis  3   Nodularity of the bilateral adrenal gland suggesting subcentimeter lesions  Although its imaging features are indeterminate, it does not have suspicious imaging features (heterogeneity, necrosis, irregular margins), therefore this is likely benign,   and can be followed by non-contrast abdomen CT or MRI in 12 months  If patient has history of adrenal hyperfunction, consider biochemical       Lab Results   Component Value Date    HGBA1C 10 5 (H) 08/24/2018     BP Readings from Last 3 Encounters:   08/24/18 140/97     Results for Jacinda Foss (MRN [id number]) as of 8/24/2018 14:38   Ref   Range 8/23/2018 17:39 8/24/2018 05:19   Sodium Latest Ref Range: 136 - 145 mmol/L 130 (L) 136   Potassium Latest Ref Range: 3 5 - 5 3 mmol/L 4 1 3 7   Chloride Latest Ref Range: 100 - 108 mmol/L 90 (L) 100   CO2 Latest Ref Range: 21 - 32 mmol/L 28 27   Anion Gap Latest Ref Range: 4 - 13 mmol/L 12 9   BUN Latest Ref Range: 5 - 25 mg/dL 18 16   Creatinine Latest Ref Range: 0 60 - 1 30 mg/dL 1 54 (H) 0 80   Glucose Latest Ref Range: 65 - 140 mg/dL 655 (HH) 225 (H)   Calcium Latest Ref Range: 8 3 - 10 1 mg/dL 10 4 (H) 9 4   AST (SGOT) Latest Ref Range: 5 - 45 U/L 9 ALT Latest Ref Range: 12 - 78 U/L 14    ALK PHOS Latest Ref Range: 46 - 116 U/L 155 (H)    Total Protein Latest Ref Range: 6 4 - 8 2 g/dL 9 4 (H)    Albumin Latest Ref Range: 3 5 - 5 0 g/dL 3 6    Total Bilirubin Latest Ref Range: 0 20 - 1 00 mg/dL 0 31    eGFR Latest Units: ml/min/1 73sq m 51 102   Lipase Latest Ref Range: 73 - 393 u/L 1,013 (H)        Inpatient consult to Endocrinology  Consult performed by: [facility], [facility]  ordered by: Lorna Sutter          Review of Systems   Constitutional: Positive for activity change  Negative for diaphoresis, fatigue, fever and unexpected weight change  HENT: Negative  Eyes: Negative for visual disturbance  Respiratory: Negative for cough, chest tightness and shortness of breath  Cardiovascular: Negative for chest pain, palpitations and leg swelling  Gastrointestinal: Positive for abdominal pain and nausea  Negative for constipation, diarrhea and vomiting  Endocrine: Negative for cold intolerance, heat intolerance, polydipsia, polyphagia and polyuria  Genitourinary: Negative for dysuria, enuresis, frequency and urgency  Musculoskeletal: Negative for arthralgias and myalgias  Skin: Negative for pallor, rash and wound  Allergic/Immunologic: Negative  Neurological: Positive for weakness  Negative for dizziness, tremors and numbness  Hematological: Negative  Psychiatric/Behavioral: Negative  Historical Information   Past Medical History:   Diagnosis Date    Diabetes mellitus ([facility] )     Hyperlipidemia     Hypertension      Past Surgical History:   Procedure Laterality Date    ABCESS DRAINAGE      HERNIA REPAIR       Social History   History   Alcohol Use No     History   Drug Use No     History   Smoking Status    Current Every Day Smoker    Packs/day: 1 00   Smokeless Tobacco    Never Used     Family History: History reviewed  No pertinent family history      Meds/Allergies   Current Facility-Administered Medications Medication Dose Route Frequency Provider Last Rate Last Dose    ALPRAZolam Beula Jamel) tablet 2 mg  2 mg Oral Daily Benito Zarate MD   2 mg at 08/24/18 0840    cloNIDine (CATAPRES) tablet 0 3 mg  0 3 mg Oral BID Benito Zarate MD   0 3 mg at 08/24/18 0840    hydrOXYzine HCL (ATARAX) tablet 25 mg  25 mg Oral Q6H PRN Benito Zarate MD        insulin glargine (LANTUS) subcutaneous injection 20 Units 0 2 mL  20 Units Subcutaneous Q12H [facility], DO   20 Units at 08/24/18 1211    insulin lispro (HumaLOG) 100 units/mL subcutaneous injection 1-6 Units  1-6 Units Subcutaneous HS Barnett Hodgkin, DO        insulin lispro (HumaLOG) 100 units/mL subcutaneous injection 10 Units  10 Units Subcutaneous TID With 181 Andie Lance, DO   10 Units at 08/24/18 1300    insulin lispro (HumaLOG) 100 units/mL subcutaneous injection 2-12 Units  2-12 Units Subcutaneous TID [facility] Barnett Hodgkin, DO   2 Units at 08/24/18 1300    lisinopril (ZESTRIL) tablet 5 mg  5 mg Oral Daily Barnett Hodgkin, DO   5 mg at 08/24/18 1211    nicotine (NICODERM CQ) 21 mg/24 hr TD 24 hr patch 1 patch  1 patch Transdermal Daily Benito Zarate MD        ondansetron (ZOFRAN) injection 4 mg  4 mg Intravenous Q6H PRN Benito Zarate MD        oxyCODONE-acetaminophen (PERCOCET) 5-325 mg per tablet 1 tablet  1 tablet Oral Q6H PRN Barnett Hodgkin, DO         Allergies   Allergen Reactions    Penicillins     Toradol [Ketorolac Tromethamine]        Objective   Vitals: Blood pressure 140/97, pulse 69, temperature 97 7 °F (36 5 °C), temperature source Temporal, resp  rate 18, height 5' 9" (1 753 m), weight 77 7 kg (171 lb 4 8 oz), SpO2 100 %      Intake/Output Summary (Last 24 hours) at 08/24/18 1438  Last data filed at 08/24/18 1301   Gross per 24 hour   Intake          3413 77 ml   Output                0 ml   Net          3413 77 ml     Invasive Devices     Peripheral Intravenous Line            Peripheral IV 08/23/18 Left Forearm less than 1 day    Peripheral IV 08/23/18 Left Hand less than 1 day                Physical Exam   Constitutional: He is oriented to person, place, and time  He appears well-developed and well-nourished  No distress  HENT:   Head: Normocephalic and atraumatic  Eyes: Conjunctivae and EOM are normal  Pupils are equal, round, and reactive to light  Right eye exhibits no discharge  Left eye exhibits no discharge  Neck: Normal range of motion  Neck supple  No tracheal deviation present  No thyromegaly present  Cardiovascular: Normal rate, regular rhythm, normal heart sounds and intact distal pulses  Exam reveals no friction rub  No murmur heard  Pulmonary/Chest: Effort normal and breath sounds normal  No respiratory distress  He has no wheezes  He has no rales  He exhibits no tenderness  Abdominal: Soft  Bowel sounds are normal  He exhibits no distension  There is no tenderness  Musculoskeletal: Normal range of motion  He exhibits no edema, tenderness or deformity  Lymphadenopathy:     He has no cervical adenopathy  Neurological: He is alert and oriented to person, place, and time  He has normal reflexes  No cranial nerve deficit  He exhibits normal muscle tone  Coordination normal    Skin: Skin is warm and dry  No rash noted  He is not diaphoretic  No erythema  No pallor  Psychiatric: He has a normal mood and affect  His behavior is normal    Vitals reviewed  The history was obtained from the review of the chart, patient      Lab Results:     Results from last 7 days  Lab Units 08/24/18  0519   HEMOGLOBIN A1C % 10 5*     Lab Results   Component Value Date    WBC 7 96 08/24/2018    HGB 11 6 (L) 08/24/2018    HCT 35 3 (L) 08/24/2018    MCV 90 08/24/2018     (H) 08/24/2018     Lab Results   Component Value Date/Time    BUN 16 08/24/2018 05:19 AM     08/24/2018 05:19 AM    K 3 7 08/24/2018 05:19 AM     08/24/2018 05:19 AM    CO2 27 08/24/2018 05:19 AM    CREATININE 0 80 08/24/2018 05:19 AM    AST 9 08/23/2018 05:39 PM    ALT 14 08/23/2018 05:39 PM    ALB 3 6 08/23/2018 05:39 PM     No results for input(s): CHOL, HDL, LDL, TRIG, VLDL in the last 72 hours  No results found for: Aiyana Naranjo  POC Glucose (mg/dl)   Date Value   08/24/2018 185 (H)   08/24/2018 152 (H)   08/24/2018 299 (H)   08/24/2018 158 (H)   08/24/2018 227 (H)   08/24/2018 269 (H)   08/24/2018 418 (H)   08/23/2018 >500 (HH)   08/23/2018 >500 (HH)   08/23/2018 >500 (HH)       Imaging Studies: I have personally reviewed pertinent reports  Portions of the record may have been created with voice recognition software

## 2018-08-24 NOTE — CONSULTS
Consultation - Sanjuanita Tracey 48 y o  male MRN: [id number]    Unit/Bed#: [place] [address] -01 Encounter: [id number]      Assessment/Plan     Assessment:  Acne Conglobata  Pilonidal cyst  Factitial ulcerations    Plan:  1  Patient requires long term therapy for this condition  States he lives in [place] and is visiting this area  Unfortunately he also states that he failed isotretinoin treatment when he was in his 19's  He states he had two years of isotretinoin therapy  Doxycycline makes him sick to his stomach and he is allergic to Penicillin  Initially it is best to cool down the inflammation with antibiotics and prednisone  He is having difficulty controlling his blood sugar so prednisone is not a good option at this time  Dapsone can also be used to treat the inflammation  Recommend Minocycline 100 mg BID to diminish the inflammation and control secondary infection  Culture taken  If not tolerated Bactrim would also be an option  Due to severity of acne patient susceptible to develop hidradenitis, folliculitis of the scalp and pilonidal sinus and abscess  2  Recommend surgical evaluation for the pilonidal cyst of the sacrum  Area extremely tender  Also would appreciate surgical evaluation of the fibrous band of the right axilla due to an I&D of cyst   3  Lesions are tender and patient picks at lesions to open them up forming multiple ulcerations  Treatment of underlying acne should diminish pain and the tendency to excoriate lesions  Topical mupirocin recommended  HPI: Sanjuanita Tracey is a 48y o  year old male who presents with hyperglycemia  Patient also with lesions mostly of back and shoulders  Patient also with lesions of scalp and abdomen  Lesions recently flared over the past month  Patient with long term cystic acne since his teenage years  He was treated in his 19's with isotretinoin for 2 years without result    States that he has not had any treatment in the past 10 years except for drainage of cysts  He states over the past 1-2 months lesions have become more tender and are draining  No treatment given for acne  He is visiting family in this area for a month  He is not followed by dermatology and does not have a PCP in [place]  Consults    Review of Systems   Constitutional: Positive for fatigue  HENT: Negative  Eyes: Negative  Respiratory: Negative  Cardiovascular: Negative  Gastrointestinal: Negative  Skin: Positive for rash (Multiple tender cysts and ulcers  Unable to lift right arm beyond 90 degrees due to scarring ) and wound  Psychiatric/Behavioral: Negative for agitation and confusion  The patient is nervous/anxious  Historical Information   Past Medical History:   Diagnosis Date    Acne conglobata 1985    Treated with Accutane x 2 years    Diabetes mellitus ([address] )     Hyperlipidemia     Hypertension     Pilonidal cyst with abscess 08/24/2018     Past Surgical History:   Procedure Laterality Date    ABCESS DRAINAGE      HERNIA REPAIR       Social History   History   Alcohol Use No     History   Drug Use No     History   Smoking Status    Current Every Day Smoker    Packs/day: 1 00   Smokeless Tobacco    Never Used     Family History: non-contributory    Meds/Allergies   all current active meds have been reviewed  Allergies   Allergen Reactions    Penicillins     Toradol [Ketorolac Tromethamine]        Objective   Vitals: Blood pressure 140/97, pulse 69, temperature 97 7 °F (36 5 °C), temperature source Temporal, resp  rate 18, height 5' 9" (1 753 m), weight 77 7 kg (171 lb 4 8 oz), SpO2 100 %      Intake/Output Summary (Last 24 hours) at 08/24/18 1444  Last data filed at 08/24/18 1301   Gross per 24 hour   Intake          3413 77 ml   Output                0 ml   Net          3413 77 ml     Invasive Devices     Peripheral Intravenous Line            Peripheral IV 08/23/18 Left Forearm less than 1 day    Peripheral IV 08/23/18 Left Hand less than 1 day                Physical Exam   Constitutional: He is oriented to person, place, and time  Poorly nourished   HENT:   Head: Normocephalic  Eyes: Conjunctivae and EOM are normal  Pupils are equal, round, and reactive to light  Musculoskeletal:   Fibrous band right axilla that restricts motion of arm  Patient unable to move arm upwards more than 90 degrees      Neurological: He is alert and oriented to person, place, and time  Skin: Skin is warm and dry  Patient with extensive scarring of back with depressed scars, comedones and cysts  Multiple paired comedones of back  Scattered pustules and crusts  Multiple superficial erosions  Fibrous band of right axilla  Psychiatric: His behavior is normal  Thought content normal        Lab Results: I have personally reviewed pertinent reports  Imaging Studies: I have personally reviewed pertinent reports  Code Status: Level 1 - Full Code    Counseling / Coordination of Care  Total floor / unit time spent today 80 minutes  Greater than 50% of total time was spent with the patient and / or family counseling and / or coordination of care  A description of the counseling / coordination of care: discussed acne and how lesions are related  Also discussed the need for him to contact a dermatologist at home and have ongoing care

## 2018-08-24 NOTE — SOCIAL WORK
CM met with pt at bedside  Pt was very drowsy and fell asleep mid conversation  Pt lives in an apartment by himself  He reports he has a support system between family and friends  ADL's are completed independently with no DME use  Pt does not currently have a PCP  Pt reports he is from [place]  Pt reports he uses a couple different pharmacies  Pt was not able to answer the rest of the questions  Cm will f/u with patient on Monday if still here

## 2018-08-24 NOTE — CONSULTS
Consult Note - Wound   Carolina Spence 48 y o  male MRN: [id number]  Unit/Bed#: [place] 2 -01 Encounter: [id number]      History and Present Illness:  48year old male presented to the hospital with elevated glucose, polydipsia, and polyuria  Patient has history of DM, HTN, and cystic acne  He reports having "other abscess wounds" in the past       Assessment Findings:   Patient reports chronic back pain  Sitting up at bedside  Able to stand for assessment independently  Multiple scabbed areas and areas of hypo and hyperpigmented scar tissue to patient's back and chest possible due to cystic acne  However, patient reports these areas are not presenting as his cystic acne has presented in the past   Midline sacrum/coccyx is erythematous and blanchable with recently epithelialized area over coccyx that patient reports is very tender  1   Present on admission wound to left lower back of unknown etiology--wound is perfectly oval with beefy red and pink wound bed, not over bony prominence  Patient denies any trauma to the area  There are tiny white buds scattered in wound bed  Wound edges have tan scaling and hyperpigmentation--very atypical presentation  Patient states wound has been present for about 1 month  He reports pruritis and pain at wound site  Minimal bloody drainage after cleansing  No induration or fluctuance appreciated  Wound does not appear infected  Plan:   1-Protect sacrum w/Allevyn foam, aram with P, change q3d and PRN, check skin q-shift  2-Dermatology consult pending  3-Elevate heels to offload pressure]  4-Moisturize skin daily with skin nourishing cream  5-Soft care cushion when out of bed  6-Hydraguard to bilateral heels BID and PRN  7-Cleanse left back wound with soap and water, pat dry  Apply non-adherent telfa dressing until seen by dermatology  Change dressing daily  Plan of care reviewed with primary RN, Juan Ramon Candelaria  Wound care team will sign-off        Vitals: Blood pressure 140/97, pulse 69, temperature 97 7 °F (36 5 °C), temperature source Temporal, resp  rate 18, weight 77 7 kg (171 lb 4 8 oz), SpO2 100 %  ,There is no height or weight on file to calculate BMI  Wound 08/23/18 Other (Comment) Back Lower; Left unknown etiology (Active)   Wound Description Clean;Beefy red;Pink 8/24/2018 11:00 AM   Anabel-wound Assessment Other (Comment); Hyperpigmented 8/24/2018 11:00 AM   Shape Oval 8/24/2018 11:00 AM   Wound Length (cm) 3 cm 8/24/2018 11:00 AM   Wound Width (cm) 5 cm 8/24/2018 11:00 AM   Wound Depth (cm) 0 1 8/24/2018 11:00 AM   Calculated Wound Volume (cm^3) 1 5 cm^3 8/24/2018 11:00 AM   Closure Open to air 8/23/2018 11:40 PM   Drainage Amount Scant 8/24/2018 11:00 AM   Drainage Description Bloody 8/24/2018 11:00 AM   Non-staged Wound Description Partial thickness 8/24/2018 11:00 AM   Treatments Cleansed;Site care 8/24/2018 11:00 AM   Dressing Non adherent 8/24/2018 11:00 AM   Patient Tolerance Tolerated well 8/24/2018 11:00 AM   Dressing Status Clean;Dry; Intact 8/24/2018 11:00 AM     Stephanie KNIGHTN, RN, [employer]

## 2018-08-24 NOTE — ASSESSMENT & PLAN NOTE
Patient has multiple excoriated pruritic lesions on back and upper aspect of chest   Could be related to poorly controlled diabetes  Dermatology consultation will be obtained    Continue with Atarax  Calamine  lotion for itching

## 2018-08-24 NOTE — CONSULTS
Consulted for DM diet ed  Attempted to provided DM and pancreatitis diet ed but pt was lethargic/sleepy, provided written material and contact information, encouraged balanced meals, reviewed CHO foods, non CHO's and suggested avoiding high fat, pt will need reinforcement  Rec CCD2, low fat diet  Consider checking lipid profile

## 2018-08-24 NOTE — PROGRESS NOTES
Progress Note - Darlene Watkins 48 y o  male MRN: [id number]    Unit/Bed#: [place] [address] -01 Encounter: [id number]    Assessment/Plan:    Acute kidney injury  related to poor diabetic control and dehydration, now resolved with IV fluids    Poorly controlled diabetes discussed diet and exercise with patient and compliance with insulin, will DC insulin infusion restart Lantus 40 units with 10 units before meals with ADA diet and continue sliding scale    Hypertension   will add lisinopril with clonidine for control    Skin erosion   most likely infected hair follicle related to poorly controlled diabetes excoriation, recommend better diabetic control to control erosions    Hyponatremia  related to hyperglycemia now corrected with glucose control and IV fluids    Tobacco abuse  cessation counseling provided denies patch    Subjective:   Feels better, still complains of the skin erosions itchy/pain denies chest pain shortness of breath nausea vomiting diarrhea today no fevers chills appetite very good    Objective:     Vitals: Blood pressure 140/97, pulse 69, temperature 97 7 °F (36 5 °C), temperature source Temporal, resp  rate 18, weight 77 7 kg (171 lb 4 8 oz), SpO2 100 %  ,There is no height or weight on file to calculate BMI          Results from last 7 days  Lab Units 08/24/18  0519   WBC Thousand/uL 7 96   HEMOGLOBIN g/dL 11 6*   HEMATOCRIT % 35 3*   PLATELETS Thousands/uL 459*       Results from last 7 days  Lab Units 08/24/18  0519 08/23/18  1739   SODIUM mmol/L 136 130*   POTASSIUM mmol/L 3 7 4 1   CHLORIDE mmol/L 100 90*   CO2 mmol/L 27 28   BUN mg/dL 16 18   CREATININE mg/dL 0 80 1 54*   CALCIUM mg/dL 9 4 10 4*   TOTAL PROTEIN g/dL  --  9 4*   BILIRUBIN TOTAL mg/dL  --  0 31   ALK PHOS U/L  --  155*   ALT U/L  --  14   AST U/L  --  9   GLUCOSE RANDOM mg/dL 225* 655*       Scheduled Meds:    Current Facility-Administered Medications:  acetaminophen 650 mg Oral Q6H Diaz Lee MD   ALPRAZolam 2 mg Oral Daily Doug Thrasher MD   cloNIDine 0 3 mg Oral BID Doug Thrasher MD   hydrOXYzine HCL 25 mg Oral Q6H PRN Doug Thrasher MD   insulin glargine 20 Units Subcutaneous Q12H [facility],    insulin lispro 10 Units Subcutaneous TID With Meals [facility],    lisinopril 5 mg Oral Daily [facility],    nicotine 1 patch Transdermal Daily Doug Thrasher MD   ondansetron 4 mg Intravenous Q6H PRN Doug Thrasher MD   oxyCODONE-acetaminophen 1 tablet Oral Q4H PRN Doug Thrasher MD       Continuous Infusions:     Physical exam:  General appearance:  Alert no distress interaction appropriate   Head/Eyes:  Nonicteric PERRL EOMI  Neck:  Supple  Lungs: CTA bilateral no wheezing rhonchi or rales  Heart: normal S1 S2 regular  Abdomen: Soft nontender with bowel sounds  Extremities: no edema  Skin: no rash multiple erosions chest and back    Invasive Devices     Peripheral Intravenous Line            Peripheral IV 08/23/18 Left Forearm less than 1 day    Peripheral IV 08/23/18 Left Hand less than 1 day                        Counseling / Coordination of Care  Total floor / unit time spent today 30   minutes  Greater than 50% of total time was spent with the patient and / or family counseling and / or coordination of care    A description of the counseling / coordination of care:

## 2018-08-24 NOTE — PROGRESS NOTES
Patient was caught smoking in his bathroom tonight  When asked to come out of the bathroom there was a cloud of smoke  I informed the patient of the risks involved with smoking in the bathroom and asked for his remaining cigarettes and lighter  The hospital supervisor was on the floor at the time and also went in to talk to the patient

## 2018-08-25 VITALS
HEART RATE: 54 BPM | HEIGHT: 69 IN | OXYGEN SATURATION: 99 % | BODY MASS INDEX: 25.37 KG/M2 | TEMPERATURE: 97.6 F | RESPIRATION RATE: 18 BRPM | DIASTOLIC BLOOD PRESSURE: 75 MMHG | WEIGHT: 171.3 LBS | SYSTOLIC BLOOD PRESSURE: 114 MMHG

## 2018-08-25 LAB
ANION GAP SERPL CALCULATED.3IONS-SCNC: 8 MMOL/L (ref 4–13)
BUN SERPL-MCNC: 9 MG/DL (ref 5–25)
CALCIUM SERPL-MCNC: 9.2 MG/DL (ref 8.3–10.1)
CHLORIDE SERPL-SCNC: 102 MMOL/L (ref 100–108)
CO2 SERPL-SCNC: 27 MMOL/L (ref 21–32)
CREAT SERPL-MCNC: 0.7 MG/DL (ref 0.6–1.3)
GFR SERPL CREATININE-BSD FRML MDRD: 108 ML/MIN/1.73SQ M
GLUCOSE SERPL-MCNC: 106 MG/DL (ref 65–140)
GLUCOSE SERPL-MCNC: 141 MG/DL (ref 65–140)
GLUCOSE SERPL-MCNC: 176 MG/DL (ref 65–140)
GLUCOSE SERPL-MCNC: 207 MG/DL (ref 65–140)
POTASSIUM SERPL-SCNC: 3.7 MMOL/L (ref 3.5–5.3)
SODIUM SERPL-SCNC: 137 MMOL/L (ref 136–145)

## 2018-08-25 PROCEDURE — 80048 BASIC METABOLIC PNL TOTAL CA: CPT | Performed by: INTERNAL MEDICINE

## 2018-08-25 PROCEDURE — 82948 REAGENT STRIP/BLOOD GLUCOSE: CPT

## 2018-08-25 PROCEDURE — 99239 HOSP IP/OBS DSCHRG MGMT >30: CPT | Performed by: INTERNAL MEDICINE

## 2018-08-25 RX ORDER — LISINOPRIL 10 MG/1
10 TABLET ORAL DAILY
Status: DISCONTINUED | OUTPATIENT
Start: 2018-08-25 | End: 2018-08-25 | Stop reason: HOSPADM

## 2018-08-25 RX ORDER — ALPRAZOLAM 1 MG/1
1 TABLET ORAL 3 TIMES DAILY PRN
Qty: 15 TABLET | Refills: 0 | Status: SHIPPED | OUTPATIENT
Start: 2018-08-25 | End: 2018-09-04

## 2018-08-25 RX ORDER — OXYCODONE HYDROCHLORIDE AND ACETAMINOPHEN 5; 325 MG/1; MG/1
1 TABLET ORAL EVERY 6 HOURS PRN
Status: DISCONTINUED | OUTPATIENT
Start: 2018-08-25 | End: 2018-08-25 | Stop reason: HOSPADM

## 2018-08-25 RX ORDER — ALPRAZOLAM 0.5 MG/1
1 TABLET ORAL 3 TIMES DAILY PRN
Status: DISCONTINUED | OUTPATIENT
Start: 2018-08-25 | End: 2018-08-25 | Stop reason: HOSPADM

## 2018-08-25 RX ORDER — LISINOPRIL 10 MG/1
10 TABLET ORAL DAILY
Qty: 30 TABLET | Refills: 0 | Status: SHIPPED | OUTPATIENT
Start: 2018-08-26

## 2018-08-25 RX ORDER — MINOCYCLINE HYDROCHLORIDE 100 MG/1
100 CAPSULE ORAL EVERY 12 HOURS SCHEDULED
Qty: 20 CAPSULE | Refills: 0 | Status: SHIPPED | OUTPATIENT
Start: 2018-08-25 | End: 2018-09-04

## 2018-08-25 RX ORDER — LISINOPRIL 10 MG/1
10 TABLET ORAL DAILY
Status: DISCONTINUED | OUTPATIENT
Start: 2018-08-26 | End: 2018-08-25

## 2018-08-25 RX ORDER — OXYCODONE HYDROCHLORIDE AND ACETAMINOPHEN 5; 325 MG/1; MG/1
1 TABLET ORAL EVERY 6 HOURS PRN
Qty: 15 TABLET | Refills: 0 | Status: SHIPPED | OUTPATIENT
Start: 2018-08-25 | End: 2018-09-04

## 2018-08-25 RX ORDER — LISINOPRIL 20 MG/1
20 TABLET ORAL DAILY
Status: DISCONTINUED | OUTPATIENT
Start: 2018-08-26 | End: 2018-08-25 | Stop reason: HOSPADM

## 2018-08-25 RX ORDER — HYDROXYZINE HYDROCHLORIDE 25 MG/1
25 TABLET, FILM COATED ORAL EVERY 6 HOURS PRN
Qty: 30 TABLET | Refills: 0 | Status: SHIPPED | OUTPATIENT
Start: 2018-08-25

## 2018-08-25 RX ADMIN — INSULIN LISPRO 10 UNITS: 100 INJECTION, SOLUTION INTRAVENOUS; SUBCUTANEOUS at 07:59

## 2018-08-25 RX ADMIN — OXYCODONE HYDROCHLORIDE AND ACETAMINOPHEN 1 TABLET: 5; 325 TABLET ORAL at 11:24

## 2018-08-25 RX ADMIN — CLONIDINE HYDROCHLORIDE 0.3 MG: 0.1 TABLET ORAL at 08:00

## 2018-08-25 RX ADMIN — OXYCODONE HYDROCHLORIDE AND ACETAMINOPHEN 1 TABLET: 5; 325 TABLET ORAL at 04:05

## 2018-08-25 RX ADMIN — INSULIN LISPRO 2 UNITS: 100 INJECTION, SOLUTION INTRAVENOUS; SUBCUTANEOUS at 03:27

## 2018-08-25 RX ADMIN — INSULIN LISPRO 2 UNITS: 100 INJECTION, SOLUTION INTRAVENOUS; SUBCUTANEOUS at 07:59

## 2018-08-25 RX ADMIN — MUPIROCIN: 20 OINTMENT TOPICAL at 08:03

## 2018-08-25 RX ADMIN — INSULIN LISPRO 10 UNITS: 100 INJECTION, SOLUTION INTRAVENOUS; SUBCUTANEOUS at 11:25

## 2018-08-25 RX ADMIN — LISINOPRIL 5 MG: 5 TABLET ORAL at 08:00

## 2018-08-25 RX ADMIN — HYDROXYZINE HYDROCHLORIDE 25 MG: 25 TABLET, FILM COATED ORAL at 04:05

## 2018-08-25 RX ADMIN — ALPRAZOLAM 2 MG: 0.5 TABLET ORAL at 08:00

## 2018-08-25 RX ADMIN — MINOCYCLINE HYDROCHLORIDE 100 MG: 100 CAPSULE ORAL at 08:00

## 2018-08-25 RX ADMIN — INSULIN GLARGINE 40 UNITS: 100 INJECTION, SOLUTION SUBCUTANEOUS at 08:00

## 2018-08-25 RX ADMIN — LISINOPRIL 10 MG: 10 TABLET ORAL at 10:24

## 2018-08-25 NOTE — PROGRESS NOTES
Patient requesting pain medication for sleep  Schedule and use of Percocet explained, met with gonzales  Patient then requesting sandwich and madison crackers  Education on diet plan attempted, patient becoming increasingly aggressive and yelling at staff

## 2018-08-25 NOTE — PROGRESS NOTES
Patient remains agitated, allowed 1 attempt at AM lab draw by PCA, which was unsuccessful  Refuses further attempts  Reports he does not want this RN or PCA taking care of him because we have refused to give him food  Unable to educate on diet    Patient made comfortable in bed and left to rest

## 2018-08-25 NOTE — PROGRESS NOTES
At 2200, patient observed to become belligerent, swearing at staff and demanding meal   Patient had eaten several snacks throughout evening, full dinner, and it was reported he had had several snacks throughout day  Attempts to educate on diet plan unsuccessful  Patient demanding Ativan, becoming increasingly aggressive  Call placed to [facility], IV Ativan given as ordered, turkey sandwich provided  [facility] provider advised to give 1 food item only tonight  Upon finishing sandwich, patient demanding more food, reports that he has not eaten in several hours and is starving  At 2330, patient demanding more Ativan  Emotional support provided, poorly received  Remains closely monitored

## 2018-08-25 NOTE — PROGRESS NOTES
Patient found to be pulling long strips of scab and skin off multiple wound sites on shoulders and back  Bleeding observed from 11 areas of ulceration  Wounds cleaned, dry sterile dressings applied  R anterior shoulder wound irrigated with saline rinse to remove purulence, covered with dermagran and DSD  Attempts to educate on infection prevention met with anger and denial   Dried blood observed on patients hands and nails, declines to wash hands at this time  Atarax given as ordered, patient remains closely monitored for safety

## 2018-08-25 NOTE — DISCHARGE SUMMARY
Discharge Summary - Medical Aline Medrano 48 y o  male MRN: [id number]    [facility] 68 2 MED SURG Room / Bed: [place] [address] 2 /South 2 M* Encounter: [id number]    BRIEF OVERVIEW    Admitting Provider: Emmett Wolf MD  Discharge Provider: Madelyn Almeida DO  Admission Date: 8/23/2018     Discharge Date: No discharge date for patient encounter  Primary Care Physician at Discharge: No primary care provider on file  None    Primary Discharge Diagnosis  Acute kidney injury  Uncontrolled diabetes  Pilonidal cyst  Acne conglobata    Other Problems Addressed  Patient Active Problem List    Diagnosis Date Noted    Type 2 diabetes mellitus with hyperglycemia, with long-term current use of insulin ([facility] [address] ) 08/23/2018    Acute kidney injury ([facility] [address] ) 08/23/2018    Chronic pancreatitis ([facility] [address] ) 08/23/2018    Hyponatremia 08/23/2018    Hypercalcemia 08/23/2018    Adrenal adenoma 08/23/2018    Skin lesion of back 08/23/2018       Consulting Providers   Dr Nat Felix Dermatology    Discharge Disposition: home    Allergies  Allergies   Allergen Reactions    Penicillins     Toradol [Ketorolac Tromethamine]      Diet restrictions:  diabetic diet   Activity restrictions: none   Discharge Condition:  good       Outpatient Follow-Up  PCP in 1 week  Follow up with consulting providers  Make appointment with Dermatology and surgery [address]    Presenting Problem/History of Present Illness  Type 2 diabetes mellitus with hyperglycemia, with long-term current use of insulin [facility])  Hospital Course  48year-old presented with elevated blood glucose levels    Uncontrolled diabetes  patient with poor compliance especially with diet, presented with sugars greater than 500  She was initiated on an insulin infusion, there was no anion gap, with better control use transition to his usual schedule Lantus with NovoLog before meals    Discussed did carbohydrate restriction ADA diet which the patient will attempt to comply with  Glucose 1:41 a m  of discharge    Acute kidney injury  creatinine on admission 1 54, pre renal with dehydration, with IV fluids creatinine improved to 0 7 prior to discharge    Skin lesion   patient was seen in consultation with Dermatology, recommend bacitracin local and doxycycline  Diagnosis of acne and pineal cyst   Recommendation is follow up in [place] with the patient resides Dermatology and surgery ASAP for monitoring and further intervention if indicated  Tobacco abuse   recommend cessation, patient refused patch    Hypertension   blood pressure elevated during admission he was initiated on lisinopril 10 milligram, blood pressure prior to discharge 114/75 improved from 174/102  Discharge  Statement   I spent 40 minutes discharging the patient  This time was spent on the day of discharge  I had direct contact with the patient on the day of discharge  Additional documentation is required if more than 30 minutes were spent on discharge  Explained discharge, Tobacco cessation, compliance with diet and insulin, and follow up with Dermatology and surgery    Discharge instructions/Information to patient and family:   See after visit summary for information provided to patient and family

## 2018-08-25 NOTE — PROGRESS NOTES
Progress Note - Armando Fitch 48 y o  male MRN: [id number]    Unit/Bed#: [place] [address] -01 Encounter: [id number]    Assessment/Plan:    MAYRA    pre renal with dehydration now resolved with IV fluids    Uncontrolled diabetes  related to poor compliance with diet and medications, continue Lantus and NovoLog and adhere to diabetic diet    Skin lesion   reviewed Dermatology consult, history of acne conglobata, pilonidal cyst and factitial ulceration, continue minocycline    Pilonidal cyst   recommend surgery evaluation    Hypertension   increase lisinopril with clonidine for control    Tobacco abuse  cessation counseling provided denies patch    Subjective:   Complains of pain associated with his skin lesions, anxiety denies chest pain shortness of breath nausea vomiting no fevers chills appetite is very good    Objective:     Vitals: Blood pressure (!) 174/102, pulse 69, temperature 97 6 °F (36 4 °C), temperature source Temporal, resp  rate 18, height 5' 9" (1 753 m), weight 77 7 kg (171 lb 4 8 oz), SpO2 99 %  ,Body mass index is 25 3 kg/m²          Results from last 7 days  Lab Units 08/24/18  0519   WBC Thousand/uL 7 96   HEMOGLOBIN g/dL 11 6*   HEMATOCRIT % 35 3*   PLATELETS Thousands/uL 459*       Results from last 7 days  Lab Units 08/24/18  0519 08/23/18  1739   SODIUM mmol/L 136 130*   POTASSIUM mmol/L 3 7 4 1   CHLORIDE mmol/L 100 90*   CO2 mmol/L 27 28   BUN mg/dL 16 18   CREATININE mg/dL 0 80 1 54*   CALCIUM mg/dL 9 4 10 4*   TOTAL PROTEIN g/dL  --  9 4*   BILIRUBIN TOTAL mg/dL  --  0 31   ALK PHOS U/L  --  155*   ALT U/L  --  14   AST U/L  --  9   GLUCOSE RANDOM mg/dL 225* 655*       Scheduled Meds:    Current Facility-Administered Medications:  ALPRAZolam 2 mg Oral Daily Tricia Poe MD   cloNIDine 0 3 mg Oral BID Tricia Poe MD   hydrOXYzine HCL 25 mg Oral Q6H PRN Tricia Poe MD   insulin glargine 40 Units Subcutaneous Q12H [address] Celine Tucker PA-C   insulin lispro 1-6 Units Subcutaneous [facility] Restrepo Lionel, DO   insulin lispro 1-6 Units Subcutaneous [address], MD   insulin lispro 10 Units Subcutaneous TID With [address] Andie Lucas, DO   insulin lispro 2-12 Units [facility] Chiqui Adrien, DO   lisinopril 5 mg Oral Daily Chiqui Adrien, DO   minocycline 100 mg Oral Q12H [facility] Waylan Res, DO   mupirocin  Topical TID Michaellan Res, DO   nicotine 1 patch Transdermal Daily Rin Zhang MD   ondansetron 4 mg Intravenous Q6H PRN Rin Zhang MD       Continuous Infusions:     Physical exam:  General appearance:  Alert no distress interaction fragmented   Head/Eyes:  Nonicteric PERRL EOMI  Neck:  Supple  Lungs: CTA bilateral no wheezing rhonchi or rales  Heart: normal S1 S2 regular  Abdomen: Soft nontender with bowel sounds  Extremities: no edema  Skin: no rash    Invasive Devices     Peripheral Intravenous Line            Peripheral IV 08/23/18 Left Forearm 1 day    Peripheral IV 08/23/18 Left Hand 1 day                      Counseling / Coordination of Care  Total floor / unit time spent today 30   minutes  Greater than 50% of total time was spent with the patient and / or family counseling and / or coordination of care  A description of the counseling / coordination of care: Jodie Kumari

## 2018-08-25 NOTE — DISCHARGE INSTRUCTIONS
Stop smoking forever  Follow-up with Dermatology and surgery in [place] regarding skin lesions and rectal cyst

## 2018-08-26 LAB
BACTERIA WND AEROBE CULT: ABNORMAL
GRAM STN SPEC: ABNORMAL

## 2018-08-27 NOTE — CASE MANAGEMENT
[address] [place] [address] in the [facility] by Alfonso Carbajal for 2017  Network Utilization Review Department  Phone: [phone]; Fax [phone]  ATTENTION: The Network Utilization Review Department is now centralized for our 7 Facilities  Please call with any questions or concerns to [phone] and carefully follow the prompts so that you are directed to the right person  All voicemails are confidential  Fax any determinations, approvals, denials, and requests for initial or continue stay review clinical to [phone]  Due to HIGH CALL volume, it would be easier if you could please send faxed requests to expedite your requests and in part, help us provide discharge notifications faster   /////////////////////////////////////////////////////////////////////////////////////////////////////////////////      Continued Stay Review  (ADDENDED - 2nd fax)     8/23  LABS  vBG  7 411/   43 2/ 32 1/ 26 8  Na  130   136   K  4 1    3 7  Crt  1 54   0 80  Lipase  1013  Wbc  7 10    7 96  UA  (+)for glucose,  2+ ketones, elevated leuks,    Blood acetone  Neg  Med alcohol  <3    8/24  Continued IVF  NS   @  125 - dc'ed at 1147   Continued INSULIN gtt from  8/24  @  0112  (q 2 hr accucks)   dc'ed @ 1147     Glucose  655    Jodie Sole   418     269     227   Jodie Sole   158     299   Jodie Sole   152   Jodie Sole Jodie Sole 185    Ativan IV given @ 2207   Atarax po prn given @ 2021   IV zofran given @ 5273    Percocet po given x5     8/24  WOUND CARE CONSULT  Multiple scabbed areas and areas of hypo and hyperpigmented scar tissue to patient's back and chest possible due to cystic acne  However, patient reports these areas are not presenting as his cystic acne has presented in the past   Midline sacrum/coccyx is erythematous and blanchable with recently epithelialized area over coccyx that patient reports is very tender    1   Present on admission wound to left lower back of unknown etiology--wound is perfectly oval with beefy red and pink wound bed, not over bony prominence  Patient denies any trauma to the area  There are tiny white buds scattered in wound bed  Wound edges have tan scaling and hyperpigmentation--very atypical presentation  Patient states wound has been present for about 1 month  He reports pruritis and pain at wound site  Minimal bloody drainage after cleansing  No induration or fluctuance appreciated  Wound does not appear infected        Plan:   1-Protect sacrum w/Allevyn foam, aram with P, change q3d and PRN, check skin q-shift  2-Dermatology consult pending  3-Elevate heels to offload pressure]  4-Moisturize skin daily with skin nourishing cream  5-Soft care cushion when out of bed  6-Hydraguard to bilateral heels BID and PRN  7-Cleanse left back wound with soap and water, pat dry  Apply non-adherent telfa dressing until seen by dermatology  Change dressing daily    Myra Lightning 8/24  DERMATOLOGY  Lesions recently flared over the past month  Patient with long term cystic acne since his teenage years  He was treated in his 19's with isotretinoin for 2 years without result  States that he has not had any treatment in the past 10 years except for drainage of cysts    Assessment:  Acne Conglobata  Pilonidal cyst  Factitial ulcerations   Plan:  1  Patient requires long term therapy for this condition  States he lives in [place] and is visiting this area  Unfortunately he also states that he failed isotretinoin treatment when he was in his 19's  He states he had two years of isotretinoin therapy  Doxycycline makes him sick to his stomach and he is allergic to Penicillin  Initially it is best to cool down the inflammation with antibiotics and prednisone  He is having difficulty controlling his blood sugar so prednisone is not a good option at this time    Dapsone can also be used to treat the inflammation  Recommend Minocycline 100 mg BID to diminish the inflammation and control secondary infection  Culture taken  If not tolerated Bactrim would also be an option  Due to severity of acne patient susceptible to develop hidradenitis, folliculitis of the scalp and pilonidal sinus and abscess    2  Recommend surgical evaluation for the pilonidal cyst of the sacrum  Area extremely tender  Also would appreciate surgical evaluation of the fibrous band of the right axilla due to an I&D of cyst   3  Lesions are tender and patient picks at lesions to open them up forming multiple ulcerations  Treatment of underlying acne should diminish pain and the tendency to excoriate lesions  Topical mupirocin recommended  NOTE:  Patient became very irritable and belligerent throughout evening when not given food and Ativan on demand    Stefanoangelic Hansen   8/25/2018  Percocet given x2    INTERNAL MEDICINE  MAYRA                                          pre renal with dehydration now resolved with IV fluids     Uncontrolled diabetes             related to poor compliance with diet and medications, continue Lantus and NovoLog and adhere to diabetic diet     Skin lesion                               reviewed Dermatology consult, history of acne conglobata, pilonidal cyst and factitial ulceration, continue minocycline     Pilonidal cyst                           recommend surgery evaluation     Hypertension                           increase lisinopril with clonidine for control     Tobacco abuse                       cessation counseling provided denies patch     DC to home

## 2021-01-01 NOTE — PLAN OF CARE
DISCHARGE PLANNING     Discharge to home or other facility with appropriate resources Progressing        INFECTION - ADULT     Absence or prevention of progression during hospitalization Progressing     Absence of fever/infection during neutropenic period Progressing        Knowledge Deficit     Patient/family/caregiver demonstrates understanding of disease process, treatment plan, medications, and discharge instructions Progressing        PAIN - ADULT     Verbalizes/displays adequate comfort level or baseline comfort level Progressing        Potential for Falls     Patient will remain free of falls Progressing        SAFETY ADULT     Maintain or return to baseline ADL function Progressing     Maintain or return mobility status to optimal level Progressing cross cradle cross cradle

## 2021-12-20 NOTE — ED PROVIDER NOTES
Conjuntivae and eyelids appear normal,  Sclerae : White without injection History  Chief Complaint   Patient presents with    Hyperglycemia - no symptoms     patient transported via EMS; patient states that he feels like his sugars have been high; patient has been taking his daibetes medications; patient also complaining of wound pain from multiple wounds on patient's back and arms;     Wound Check     HPI  51-year-old man comes in for evaluation of hyperglycemia, wound pain, abdominal pain  Patient has a past medical history notable for type 2 diabetes for which he states he has been hospital multiple times, chronic pancreatitis  Patient denies any alcohol use, denies any gallbladder pathology does not know why he continues to pancreatitis  Patient states for last 3-4 days, he has had polydipsia and polyuria and increased fatigue as well as abdominal pain  Patient also states that for last month, he has had excoriated wounds on his arms and back there causing him pain  He has had progressive worsening abdominal pain for the last 2 days  Patient sources nausea without vomiting, denies fevers chills sweats, denies chest pain or shortness of breath  Prior to Admission Medications   Prescriptions Last Dose Informant Patient Reported? Taking?    ALPRAZolam ER (XANAX XR) 2 MG 24 hr tablet   Yes No   Sig: Take 2 mg by mouth every morning   Insulin Glargine (TOUJEO MAX SOLOSTAR SC)   Yes No   Sig: Inject 40 Units under the skin every morning   cloNIDine (CATAPRES) 0 3 mg tablet   Yes No   Sig: Take 0 3 mg by mouth 2 (two) times a day   insulin lispro (HumaLOG) 100 units/mL injection   Yes No   Sig: Inject under the skin   metFORMIN (GLUCOPHAGE) 1000 MG tablet   Yes No   Sig: Take 1,000 mg by mouth 2 (two) times a day with meals      Facility-Administered Medications: None       Past Medical History:   Diagnosis Date    Diabetes mellitus ([address] )     Hyperlipidemia     Hypertension        Past Surgical History:   Procedure Laterality Date    ABCESS DRAINAGE      HERNIA REPAIR History reviewed  No pertinent family history  I have reviewed and agree with the history as documented  Social History   Substance Use Topics    Smoking status: Current Every Day Smoker     Packs/day: 1 00    Smokeless tobacco: Never Used    Alcohol use No        Review of Systems   Constitutional: Negative  HENT: Negative  Eyes: Negative  Respiratory: Negative  Cardiovascular: Negative  Gastrointestinal: Positive for abdominal pain  Endocrine: Positive for polydipsia and polyuria  Genitourinary: Negative  Musculoskeletal: Negative  Skin: Positive for wound  Allergic/Immunologic: Negative  Neurological: Negative  Hematological: Negative  Psychiatric/Behavioral: Negative  Physical Exam  ED Triage Vitals   Temperature Pulse Respirations Blood Pressure SpO2   08/23/18 1710 08/23/18 1710 08/23/18 1710 08/23/18 1710 08/23/18 1710   98 3 °F (36 8 °C) 84 20 134/65 99 %      Temp Source Heart Rate Source Patient Position - Orthostatic VS BP Location FiO2 (%)   08/23/18 1710 08/23/18 1710 08/23/18 1710 08/23/18 1710 --   Oral Monitor Sitting Left arm       Pain Score       08/23/18 1747       Worst Possible Pain           Orthostatic Vital Signs  Vitals:    08/23/18 1710 08/23/18 1910 08/23/18 2022 08/23/18 2340   BP: 134/65 155/88 163/98 136/78   Pulse: 84 81 87 68   Patient Position - Orthostatic VS: Sitting Lying Lying Lying       Physical Exam   Abdominal: There is tenderness (Left upper quadrant and epigastrium  )  Skin:   Patient has excoriations on bilateral, that have erupted into wounds  There is no surrounding erythema, induration or discharge         ED Medications  Medications   ALPRAZolam (XANAX) tablet 2 mg (not administered)   cloNIDine (CATAPRES) tablet 0 3 mg (0 3 mg Oral Given 8/23/18 2105)   sodium chloride 0 9 % infusion (125 mL/hr Intravenous New Bag 8/23/18 2147)   nicotine (NICODERM CQ) 21 mg/24 hr TD 24 hr patch 1 patch (not administered) ondansetron (ZOFRAN) injection 4 mg (not administered)   acetaminophen (TYLENOL) tablet 650 mg (650 mg Oral Given 8/23/18 2219)   oxyCODONE-acetaminophen (PERCOCET) 5-325 mg per tablet 1 tablet (1 tablet Oral Given 8/23/18 2105)   hydrOXYzine HCL (ATARAX) tablet 25 mg (not administered)   insulin regular (HumuLIN R,NovoLIN R) 1 Units/mL in sodium chloride 0 9 % 100 mL infusion (not administered)   sodium chloride 0 9 % bolus 1,000 mL (0 mL Intravenous Stopped 8/23/18 1940)   ondansetron (ZOFRAN) injection 4 mg (4 mg Intravenous Given 8/23/18 1747)   fentanyl citrate (PF) 100 MCG/2ML 50 mcg (50 mcg Intravenous Given 8/23/18 1747)   iodixanol (VISIPAQUE) 320 MG/ML injection 90 mL (90 mL Intravenous Given 8/23/18 1838)   HYDROmorphone (DILAUDID) injection 0 5 mg (0 5 mg Intravenous Given 8/23/18 1907)   insulin lispro (HumaLOG) 100 units/mL subcutaneous injection 10 Units (10 Units Subcutaneous Given 8/23/18 2219)       Diagnostic Studies  Results Reviewed     Procedure Component Value Units Date/Time    Urine Microscopic [[id number]]  (Abnormal) Collected:  08/23/18 1825    Lab Status:  Final result Specimen:  Urine from Urine, Clean Catch Updated:  08/23/18 1903     RBC, UA None Seen /hpf      WBC, UA 0-1 (A) /hpf      Epithelial Cells Occasional /hpf      Bacteria, UA None Seen /hpf     Ethanol [[id number]]  (Normal) Collected:  08/23/18 1812    Lab Status:  Final result Specimen:  Blood from Arm, Left Updated:  08/23/18 1851     Ethanol Lvl <3 mg/dL     POCT urinalysis dipstick [[id number]]  (Abnormal) Resulted:  08/23/18 1818    Lab Status:  Final result Specimen:  Urine Updated:  08/23/18 1818    ED Urine Macroscopic [[id number]]  (Abnormal) Collected:  08/23/18 1825    Lab Status:  Final result Specimen:  Urine Updated:  08/23/18 1817     Color, UA Yellow     Clarity, UA Clear     pH, UA 5 5     Leukocytes, UA Elevated glucose may cause decreased leukocyte values   See urine microscopic for [facility] result/ (A) Nitrite, UA Negative     Protein, UA Negative mg/dl      Glucose, UA >=1000 (1%) (A) mg/dl      Ketones, UA 40 (2+) (A) mg/dl      Urobilinogen, UA 0 2 E U /dl      Bilirubin, UA Negative     Blood, UA Negative     Specific Gravity, UA <=1 005    Narrative:       CLINITEK RESULT    Comprehensive metabolic panel [[id number]]  (Abnormal) Collected:  08/23/18 1739    Lab Status:  Final result Specimen:  Blood from Arm, Left Updated:  08/23/18 1809     Sodium 130 (L) mmol/L      Potassium 4 1 mmol/L      Chloride 90 (L) mmol/L      CO2 28 mmol/L      Anion Gap 12 mmol/L      BUN 18 mg/dL      Creatinine 1 54 (H) mg/dL      Glucose 655 (HH) mg/dL      Calcium 10 4 (H) mg/dL      AST 9 U/L      ALT 14 U/L      Alkaline Phosphatase 155 (H) U/L      Total Protein 9 4 (H) g/dL      Albumin 3 6 g/dL      Total Bilirubin 0 31 mg/dL      eGFR 51 ml/min/1 73sq m     Narrative:         National Kidney Disease Education Program recommendations are as follows:  GFR calculation is accurate only with a steady state creatinine  Chronic Kidney disease less than 60 ml/min/1 73 sq  meters  Kidney failure less than 15 ml/min/1 73 sq  meters      Lipase [[id number]]  (Abnormal) Collected:  08/23/18 1739    Lab Status:  Final result Specimen:  Blood from Arm, Left Updated:  08/23/18 1807     Lipase 1,013 (H) u/L     Acetone [[id number]]  (Normal) Collected:  08/23/18 1739    Lab Status:  Final result Specimen:  Blood from Arm, Left Updated:  08/23/18 1804     Acetone, Bld Negative    CBC and differential [[id number]]  (Abnormal) Collected:  08/23/18 1739    Lab Status:  Final result Specimen:  Blood from Arm, Left Updated:  08/23/18 1748     WBC 7 10 Thousand/uL      RBC 4 74 Million/uL      Hemoglobin 14 0 g/dL      Hematocrit 42 6 %      MCV 90 fL      MCH 29 5 pg      MCHC 32 9 g/dL      RDW 13 8 %      MPV 10 5 fL      Platelets 049 (H) Thousands/uL      nRBC 0 /100 WBCs      Neutrophils Relative 70 %      Lymphocytes Relative 19 % Monocytes Relative 9 %      Eosinophils Relative 1 %      Basophils Relative 0 %      Neutrophils Absolute 4 99 Thousands/µL      Lymphocytes Absolute 1 36 Thousands/µL      Monocytes Absolute 0 64 Thousand/µL      Eosinophils Absolute 0 10 Thousand/µL      Basophils Absolute 0 01 Thousands/µL     Blood gas, venous [[id number]]  (Abnormal) Collected:  08/23/18 1739    Lab Status:  Final result Specimen:  Blood from Arm, Left Updated:  08/23/18 1748     pH, Scooter 7 411 (H)     pCO2, Scooter 43 2 mm Hg      pO2, Scooter 32 1 (L) mm Hg      HCO3, Scooter 26 8 mmol/L      Base Excess, Scooter 1 9 mmol/L      O2 Content, Scooter 12 2 ml/dL      O2 HGB, VENOUS 60 6 %     Narrative: Therapeutic levels (1 mg/mL and 2 mg/mL) of hydroxocobalamin may interfere with the fCOHb and fMetHb where it may cause lower than expected values    Fingerstick Glucose (POCT) [[id number]]  (Abnormal) Collected:  08/23/18 1714    Lab Status:  Final result Updated:  08/23/18 1715     POC Glucose >500 (HH) mg/dl                  CT abdomen pelvis with contrast   Final Result by Thad Morse MD (08/23 1851)         1  No evidence of acute pancreatitis  2   No evidence of bowel obstruction, colitis or diverticulitis  3   Nodularity of the bilateral adrenal gland suggesting subcentimeter lesions  Although its imaging features are indeterminate, it does not have suspicious imaging features (heterogeneity, necrosis, irregular margins), therefore this is likely benign,    and can be followed by non-contrast abdomen CT or MRI in 12 months  If patient has history of adrenal hyperfunction, consider biochemical evaluation         Recommendation based on institutional consensus and [address] of Radiology [id number];[phone]                  Workstation performed: [id number]               Procedures  Procedures      Phone Consults  ED Phone Contact    ED Course  ED Course as of Aug 23 2349   Thu Aug 23, 2018   1836 Creatinine: (!) 1 54 MDM  Number of Diagnoses or Management Options  Abdominal pain:   MAYRA (acute kidney injury) ([place] [address] ): Hyperglycemia:   Pancreatitis:   Skin lesion:   Diagnosis management comments: 80-year-old man presents for evaluation of abdominal pain  Patient also has polyuria polydipsia and a very high glucose on exam   Will get a full set of labs include CBC, CMP, lipase to assess for pancreatitis, electrolytes, kidney function, glucose, anemia, white count  Will get a VBG and acetone to assess for DKA, will defer treatment of his skin at this moment in time  Likely admission to the hospital     CritCare Time    Disposition  Final diagnoses:   MAYRA (acute kidney injury) ([place] [address] )   Pancreatitis   Hyperglycemia   Abdominal pain   Skin lesion     Time reflects when diagnosis was documented in both MDM as applicable and the Disposition within this note     Time User Action Codes Description Comment    8/23/2018  7:13 PM Wenda Seashore Add [N17 9] MAYRA (acute kidney injury) ([facility] )     8/23/2018  7:13 PM Wenda Seashore Add [K85 90] Subacute pancreatitis     8/23/2018  7:14 PM Wenda Seashore Remove [K85 90] Subacute pancreatitis     8/23/2018  7:24 PM Boo Boor [K85 90] Pancreatitis     8/23/2018  7:24 PM Wenda Seashore Add [R73 9] Hyperglycemia     8/23/2018  7:24 PM Velton Paling [N17 9] MAYRA (acute kidney injury) ([facility] )     8/23/2018  7:24 PM Wenda Seashore Modify [R73 9] Hyperglycemia     8/23/2018  7:24 PM Wenda Seashore Add [R10 9] Abdominal pain     8/23/2018  7:24 PM Wenda Seashore Add [L98 9] Skin lesion     8/23/2018  8:32 PM Cally Jonhkirsty Add [E11 65,  Z79 4] Type 2 diabetes mellitus with hyperglycemia, with long-term current use of insulin [facility])       ED Disposition     ED Disposition Condition Comment    Admit  Case was discussed with SHADI and the patient's admission status was agreed to be Admission Status: inpatient status to the service of Dr Hemalatha Cook   Follow-up Information    None         Current Discharge Medication List      CONTINUE these medications which have NOT CHANGED    Details   ALPRAZolam ER (XANAX XR) 2 MG 24 hr tablet Take 2 mg by mouth every morning      cloNIDine (CATAPRES) 0 3 mg tablet Take 0 3 mg by mouth 2 (two) times a day      Insulin Glargine (TOUJEO MAX SOLOSTAR SC) Inject 40 Units under the skin every morning      insulin lispro (HumaLOG) 100 units/mL injection Inject under the skin      metFORMIN (GLUCOPHAGE) 1000 MG tablet Take 1,000 mg by mouth 2 (two) times a day with meals           No discharge procedures on file  ED Provider  Attending physically available and evaluated Jay Armstrong I managed the patient along with the ED Attending      Electronically Signed by         Carol Beck MD  08/23/18 2347

## 2022-09-21 ENCOUNTER — OFFICE VISIT (OUTPATIENT)
Dept: URGENT CARE | Facility: MEDICAL CENTER | Age: 57
End: 2022-09-21

## 2022-09-21 ENCOUNTER — APPOINTMENT (OUTPATIENT)
Dept: RADIOLOGY | Facility: MEDICAL CENTER | Age: 57
End: 2022-09-21

## 2022-09-21 VITALS
DIASTOLIC BLOOD PRESSURE: 86 MMHG | RESPIRATION RATE: 18 BRPM | SYSTOLIC BLOOD PRESSURE: 172 MMHG | TEMPERATURE: 97.9 F | HEART RATE: 69 BPM | OXYGEN SATURATION: 100 %

## 2022-09-21 DIAGNOSIS — R07.9 CHEST PAIN, UNSPECIFIED TYPE: Primary | ICD-10-CM

## 2022-09-21 DIAGNOSIS — R07.9 CHEST PAIN, UNSPECIFIED TYPE: ICD-10-CM

## 2022-09-21 PROCEDURE — 93005 ELECTROCARDIOGRAM TRACING: CPT | Performed by: EMERGENCY MEDICINE

## 2022-09-21 PROCEDURE — 99213 OFFICE O/P EST LOW 20 MIN: CPT | Performed by: EMERGENCY MEDICINE

## 2022-09-21 RX ORDER — ATORVASTATIN CALCIUM 40 MG/1
40 TABLET, FILM COATED ORAL
COMMUNITY

## 2022-09-21 RX ORDER — OXYCODONE HYDROCHLORIDE AND ACETAMINOPHEN 5; 325 MG/1; MG/1
1 TABLET ORAL
COMMUNITY

## 2022-09-21 RX ORDER — GABAPENTIN 100 MG/1
200 CAPSULE ORAL
COMMUNITY

## 2022-09-21 RX ORDER — QUETIAPINE FUMARATE 200 MG/1
400 TABLET, FILM COATED ORAL 2 TIMES DAILY
COMMUNITY

## 2022-09-21 RX ORDER — SIMVASTATIN 20 MG
20 TABLET ORAL
COMMUNITY

## 2022-09-21 NOTE — PROGRESS NOTES
[facility]        NAME: Adryan Payne is a 62 y o  male  : 1965    MRN: [id number]  DATE: 2022  TIME: 6:43 PM    Assessment and Plan   Chest pain, unspecified type [R07 9]  1  Chest pain, unspecified type  XR chest pa & lateral    ECG 12 lead     6:44 PM  Patient recently admitted to [place] [address] for DKA  Discharge summary reads:   "Hospital Course  This is a brief description of the patient's hospital stay; please refer to medical chart for further details  Adryan Payne is a 62 y o  male with past medical history significant for Diabetes, anxiety, HTN, chronic pancreatitis who   presented to [facility] on 2022 9:05 PM with complaint of lethargy, vomiting, confusion  He was diagnosed with DKA  He has a longstanding history of admissions for his diabetes while he was living on the [facility]  He states his most recent admission was about 3 months ago  He has a longstanding history of medication noncompliance  Prior to arrival to the ED, patient stated he was feeling unwell and assumed his sugar was high and gave himself 10 units of NovoLog  Shortly after, he checked his glucose which was 40  He drank a Dr Heard Barrier and orange juice and called EMS  Per ED notes, blood glucose was greater than 500 with EMS  Initial glucose in the ED was 508  Labs notable for creatinine of 1 75, potassium 5 4, sodium 131, CO2 13 and anion gap of 20  He also had elevated lactate at 2 6  Beta hydroxybutyrate was elevated  He was admitted to the critical care team and placed on an insulin drip and IV fluids with improvement in his acid-base status  Anion gap was closed x2 and he was initiated on subcutaneous insulin  Endocrinology was consulted and his discharge regimen was Lantus 36 units daily, 7units of humalog TID with meals and ISF   Patient was visited twice by the diabetes educator and after the second visit, demonstrated understanding of signs and symptoms of hyper/hypoglycemia, and demonstrated ability to take blood sugar and administer himself insulin appropriately  He was cleared by endocrinology for discharge  Blood glucoses stable on the day of discharge  Patient is stable and agreeable to discharge  Signs and symptoms that would necessitate return to the hospital were discussed with the patient, who voiced agreement and understanding  TANVIR appointment schedule for 9/13/22"      7:03 PM  Patient never followed up with the PCP that was set up for him by [facility] on 9/13  Patient is evasive when I ask about it and says "I just couldn't make it'  He is here requesting refills of all of his medications because he has been out for 4 months  I told him that he was just discharged from the hospital and was very sick  His medications were refilled at that time  He then said "oh well I just need a few seroquel'  I again declined to refill the prescription and informed the patient that he should follow up with the PCP set up for him by [facility] or I would be happy to give him the number for a PCP in our network who is taking new patients  Patient said "well this is just a waste of time" and stood up and walked out  Patient Instructions       Follow up with PCP in 3-5 days  Proceed to  ER if symptoms worsen  Chief Complaint     Chief Complaint   Patient presents with    Chest Pain     Left rib pain x 5 days  States he underwent a ?thoracentesis approximately 4 months ago in [facility]  States this site has been troublesome since then  History of Present Illness       61 yo male presents today requesting a refill of 'all my meds"  He told the triage nurse he was having left sided chest pain since he had a thoracentesis for 'pneumonia' about 4 months ago in [place]  Recently moved here and hasn't established care  Review of Systems   Review of Systems   Constitutional: Negative for chills, fatigue and fever     HENT: Negative for postnasal drip, sore throat and trouble swallowing  Respiratory: Negative for chest tightness and shortness of breath  Cardiovascular: Positive for chest pain  Gastrointestinal: Negative for abdominal pain  Genitourinary: Negative for dysuria  Skin: Negative for rash  Allergic/Immunologic: Negative for immunocompromised state  Neurological: Negative for dizziness, light-headedness and headaches           Current Medications       Current Outpatient Medications:     ALPRAZolam (XANAX) 1 mg tablet, Take 1 tablet (1 mg total) by mouth 3 (three) times a day as needed for anxiety for up to 10 days, Disp: 15 tablet, Rfl: 0    atorvastatin (LIPITOR) 40 mg tablet, Take 40 mg by mouth, Disp: , Rfl:     cloNIDine (CATAPRES) 0 3 mg tablet, Take 0 3 mg by mouth 2 (two) times a day, Disp: , Rfl:     gabapentin (NEURONTIN) 100 mg capsule, Take 200 mg by mouth, Disp: , Rfl:     hydrOXYzine HCL (ATARAX) 25 mg tablet, Take 1 tablet (25 mg total) by mouth every 6 (six) hours as needed for itching, Disp: 30 tablet, Rfl: 0    Insulin Glargine (TOUJEO MAX SOLOSTAR SC), Inject 40 Units under the skin every morning, Disp: , Rfl:     insulin lispro (HumaLOG) 100 units/mL injection, Inject under the skin, Disp: , Rfl:     lisinopril (ZESTRIL) 10 mg tablet, Take 1 tablet (10 mg total) by mouth daily, Disp: 30 tablet, Rfl: 0    metFORMIN (GLUCOPHAGE) 1000 MG tablet, Take 1,000 mg by mouth 2 (two) times a day with meals, Disp: , Rfl:     mupirocin (BACTROBAN) 2 % ointment, Apply topically 3 (three) times a day, Disp: 22 g, Rfl: 0    oxyCODONE-acetaminophen (PERCOCET) 5-325 mg per tablet, Take 1 tablet by mouth, Disp: , Rfl:     QUEtiapine (SEROquel) 200 mg tablet, Take 200 mg by mouth, Disp: , Rfl:     simvastatin (ZOCOR) 20 mg tablet, Take 20 mg by mouth, Disp: , Rfl:     Current Allergies     Allergies as of 09/21/2022 - Reviewed 09/21/2022   Allergen Reaction Noted    Penicillins  08/23/2018    Toradol [ketorolac tromethamine]  08/23/2018 The following portions of the patient's history were reviewed and updated as appropriate: allergies, current medications, past family history, past medical history, past social history, past surgical history and problem list      Past Medical History:   Diagnosis Date    Acne conglobata 1985    Treated with Accutane x 2 years    Diabetes mellitus ([facility] [address] )     Hyperlipidemia     Hypertension     Pilonidal cyst with abscess 08/24/2018       Past Surgical History:   Procedure Laterality Date    ABCESS DRAINAGE      HERNIA REPAIR         No family history on file  Medications have been verified  Objective   BP (!) 172/86   Pulse 69   Temp 97 9 °F (36 6 °C)   Resp 18   SpO2 100%        Physical Exam     Physical Exam  Vitals and nursing note reviewed  Constitutional:       Appearance: Normal appearance  Comments: Appears chronically ill   HENT:      Head: Normocephalic and atraumatic  Comments: Poor dentition  Skin:     General: Skin is warm and dry  Capillary Refill: Capillary refill takes less than 2 seconds  Neurological:      General: No focal deficit present  Mental Status: He is alert and oriented to person, place, and time     Psychiatric:         Mood and Affect: Mood normal          Behavior: Behavior normal

## 2022-09-22 LAB
ATRIAL RATE: 65 BPM
P AXIS: 58 DEGREES
PR INTERVAL: 162 MS
QRS AXIS: 71 DEGREES
QRSD INTERVAL: 92 MS
QT INTERVAL: 422 MS
QTC INTERVAL: 438 MS
T WAVE AXIS: 62 DEGREES
VENTRICULAR RATE: 65 BPM

## 2022-09-22 PROCEDURE — 93010 ELECTROCARDIOGRAM REPORT: CPT | Performed by: INTERNAL MEDICINE
